# Patient Record
Sex: FEMALE | Race: WHITE | NOT HISPANIC OR LATINO | Employment: UNEMPLOYED | URBAN - METROPOLITAN AREA
[De-identification: names, ages, dates, MRNs, and addresses within clinical notes are randomized per-mention and may not be internally consistent; named-entity substitution may affect disease eponyms.]

---

## 2022-08-11 ENCOUNTER — HOSPITAL ENCOUNTER (INPATIENT)
Facility: OTHER | Age: 22
LOS: 3 days | Discharge: HOME | DRG: 917 | End: 2022-08-14
Attending: EMERGENCY MEDICINE | Admitting: HOSPITALIST

## 2022-08-11 DIAGNOSIS — F32.A DEPRESSION, UNSPECIFIED DEPRESSION TYPE: Chronic | ICD-10-CM

## 2022-08-11 DIAGNOSIS — T43.501A: Primary | ICD-10-CM

## 2022-08-11 DIAGNOSIS — G93.41 ACUTE METABOLIC ENCEPHALOPATHY: ICD-10-CM

## 2022-08-11 DIAGNOSIS — R41.82 ALTERED MENTAL STATUS: ICD-10-CM

## 2022-08-11 DIAGNOSIS — R56.9 SEIZURE: ICD-10-CM

## 2022-08-11 DIAGNOSIS — F10.930 ALCOHOL WITHDRAWAL SYNDROME WITHOUT COMPLICATION: ICD-10-CM

## 2022-08-11 PROBLEM — T50.901A ACCIDENTAL DRUG OVERDOSE: Status: ACTIVE | Noted: 2022-08-11

## 2022-08-11 PROBLEM — T50.901A ACCIDENTAL DRUG OVERDOSE: Status: RESOLVED | Noted: 2022-08-11 | Resolved: 2022-08-11

## 2022-08-11 LAB
ALBUMIN SERPL BCP-MCNC: 3.8 G/DL (ref 3.5–5.2)
ALP SERPL-CCNC: 84 U/L (ref 55–135)
ALT SERPL W/O P-5'-P-CCNC: 50 U/L (ref 10–44)
AMPHET+METHAMPHET UR QL: NEGATIVE
ANION GAP SERPL CALC-SCNC: 23 MMOL/L (ref 8–16)
APAP SERPL-MCNC: <3 UG/ML (ref 10–20)
AST SERPL-CCNC: 53 U/L (ref 10–40)
B-HCG UR QL: NEGATIVE
BARBITURATES UR QL SCN>200 NG/ML: NEGATIVE
BASOPHILS # BLD AUTO: 0.02 K/UL (ref 0–0.2)
BASOPHILS NFR BLD: 0.5 % (ref 0–1.9)
BENZODIAZ UR QL SCN>200 NG/ML: NEGATIVE
BILIRUB SERPL-MCNC: 0.3 MG/DL (ref 0.1–1)
BILIRUB UR QL STRIP: NEGATIVE
BUN SERPL-MCNC: 6 MG/DL (ref 6–20)
BZE UR QL SCN: NEGATIVE
CALCIUM SERPL-MCNC: 9.2 MG/DL (ref 8.7–10.5)
CANNABINOIDS UR QL SCN: NEGATIVE
CHLORIDE SERPL-SCNC: 106 MMOL/L (ref 95–110)
CLARITY UR: CLEAR
CO2 SERPL-SCNC: 15 MMOL/L (ref 23–29)
COLOR UR: YELLOW
CREAT SERPL-MCNC: 0.8 MG/DL (ref 0.5–1.4)
CREAT UR-MCNC: 156.1 MG/DL (ref 15–325)
CTP QC/QA: YES
CTP QC/QA: YES
DIFFERENTIAL METHOD: ABNORMAL
EOSINOPHIL # BLD AUTO: 0 K/UL (ref 0–0.5)
EOSINOPHIL NFR BLD: 0 % (ref 0–8)
ERYTHROCYTE [DISTWIDTH] IN BLOOD BY AUTOMATED COUNT: 13.2 % (ref 11.5–14.5)
EST. GFR  (NO RACE VARIABLE): >60 ML/MIN/1.73 M^2
ETHANOL SERPL-MCNC: <10 MG/DL
GLUCOSE SERPL-MCNC: 141 MG/DL (ref 70–110)
GLUCOSE UR QL STRIP: NEGATIVE
HCT VFR BLD AUTO: 37.1 % (ref 37–48.5)
HCV AB SERPL QL IA: NEGATIVE
HGB BLD-MCNC: 12.5 G/DL (ref 12–16)
HGB UR QL STRIP: NEGATIVE
HIV 1+2 AB+HIV1 P24 AG SERPL QL IA: NEGATIVE
IMM GRANULOCYTES # BLD AUTO: 0.03 K/UL (ref 0–0.04)
IMM GRANULOCYTES NFR BLD AUTO: 0.8 % (ref 0–0.5)
KETONES UR QL STRIP: NEGATIVE
LACTATE SERPL-SCNC: 2.1 MMOL/L (ref 0.5–2.2)
LEUKOCYTE ESTERASE UR QL STRIP: NEGATIVE
LYMPHOCYTES # BLD AUTO: 1.1 K/UL (ref 1–4.8)
LYMPHOCYTES NFR BLD: 30.1 % (ref 18–48)
MAGNESIUM SERPL-MCNC: 1.2 MG/DL (ref 1.6–2.6)
MCH RBC QN AUTO: 35.7 PG (ref 27–31)
MCHC RBC AUTO-ENTMCNC: 33.7 G/DL (ref 32–36)
MCV RBC AUTO: 106 FL (ref 82–98)
METHADONE UR QL SCN>300 NG/ML: NEGATIVE
MONOCYTES # BLD AUTO: 0.4 K/UL (ref 0.3–1)
MONOCYTES NFR BLD: 11.6 % (ref 4–15)
NEUTROPHILS # BLD AUTO: 2.1 K/UL (ref 1.8–7.7)
NEUTROPHILS NFR BLD: 57 % (ref 38–73)
NITRITE UR QL STRIP: NEGATIVE
NRBC BLD-RTO: 0 /100 WBC
OPIATES UR QL SCN: NEGATIVE
PCP UR QL SCN>25 NG/ML: NEGATIVE
PH UR STRIP: 6 [PH] (ref 5–8)
PHOSPHATE SERPL-MCNC: 4 MG/DL (ref 2.7–4.5)
PLATELET # BLD AUTO: 96 K/UL (ref 150–450)
PMV BLD AUTO: 8.7 FL (ref 9.2–12.9)
POTASSIUM SERPL-SCNC: 3.6 MMOL/L (ref 3.5–5.1)
PROT SERPL-MCNC: 6.8 G/DL (ref 6–8.4)
PROT UR QL STRIP: NEGATIVE
RBC # BLD AUTO: 3.5 M/UL (ref 4–5.4)
SALICYLATES SERPL-MCNC: <5 MG/DL (ref 15–30)
SARS-COV-2 RDRP RESP QL NAA+PROBE: NEGATIVE
SODIUM SERPL-SCNC: 144 MMOL/L (ref 136–145)
SP GR UR STRIP: >=1.03 (ref 1–1.03)
TOXICOLOGY INFORMATION: NORMAL
URN SPEC COLLECT METH UR: ABNORMAL
UROBILINOGEN UR STRIP-ACNC: NEGATIVE EU/DL
WBC # BLD AUTO: 3.72 K/UL (ref 3.9–12.7)

## 2022-08-11 PROCEDURE — 84100 ASSAY OF PHOSPHORUS: CPT | Performed by: EMERGENCY MEDICINE

## 2022-08-11 PROCEDURE — 96361 HYDRATE IV INFUSION ADD-ON: CPT

## 2022-08-11 PROCEDURE — 96365 THER/PROPH/DIAG IV INF INIT: CPT

## 2022-08-11 PROCEDURE — 80053 COMPREHEN METABOLIC PANEL: CPT | Performed by: EMERGENCY MEDICINE

## 2022-08-11 PROCEDURE — 99291 CRITICAL CARE FIRST HOUR: CPT | Mod: 25

## 2022-08-11 PROCEDURE — 99223 1ST HOSP IP/OBS HIGH 75: CPT | Mod: ,,, | Performed by: HOSPITALIST

## 2022-08-11 PROCEDURE — 63600175 PHARM REV CODE 636 W HCPCS: Performed by: EMERGENCY MEDICINE

## 2022-08-11 PROCEDURE — 81003 URINALYSIS AUTO W/O SCOPE: CPT | Mod: 59 | Performed by: HOSPITALIST

## 2022-08-11 PROCEDURE — 80143 DRUG ASSAY ACETAMINOPHEN: CPT | Performed by: EMERGENCY MEDICINE

## 2022-08-11 PROCEDURE — U0002 COVID-19 LAB TEST NON-CDC: HCPCS | Performed by: EMERGENCY MEDICINE

## 2022-08-11 PROCEDURE — 85025 COMPLETE CBC W/AUTO DIFF WBC: CPT | Performed by: EMERGENCY MEDICINE

## 2022-08-11 PROCEDURE — 87389 HIV-1 AG W/HIV-1&-2 AB AG IA: CPT | Performed by: EMERGENCY MEDICINE

## 2022-08-11 PROCEDURE — 25000003 PHARM REV CODE 250: Performed by: HOSPITALIST

## 2022-08-11 PROCEDURE — 25000003 PHARM REV CODE 250: Performed by: EMERGENCY MEDICINE

## 2022-08-11 PROCEDURE — 93005 ELECTROCARDIOGRAM TRACING: CPT

## 2022-08-11 PROCEDURE — 20000000 HC ICU ROOM

## 2022-08-11 PROCEDURE — 83605 ASSAY OF LACTIC ACID: CPT | Performed by: EMERGENCY MEDICINE

## 2022-08-11 PROCEDURE — 93010 EKG 12-LEAD: ICD-10-PCS | Mod: ,,, | Performed by: INTERNAL MEDICINE

## 2022-08-11 PROCEDURE — 82077 ASSAY SPEC XCP UR&BREATH IA: CPT | Performed by: EMERGENCY MEDICINE

## 2022-08-11 PROCEDURE — 96374 THER/PROPH/DIAG INJ IV PUSH: CPT | Mod: 59

## 2022-08-11 PROCEDURE — 86803 HEPATITIS C AB TEST: CPT | Performed by: EMERGENCY MEDICINE

## 2022-08-11 PROCEDURE — 99223 PR INITIAL HOSPITAL CARE,LEVL III: ICD-10-PCS | Mod: ,,, | Performed by: HOSPITALIST

## 2022-08-11 PROCEDURE — 96376 TX/PRO/DX INJ SAME DRUG ADON: CPT

## 2022-08-11 PROCEDURE — 63600175 PHARM REV CODE 636 W HCPCS: Performed by: HOSPITALIST

## 2022-08-11 PROCEDURE — 63600175 PHARM REV CODE 636 W HCPCS

## 2022-08-11 PROCEDURE — 83735 ASSAY OF MAGNESIUM: CPT | Performed by: EMERGENCY MEDICINE

## 2022-08-11 PROCEDURE — 80307 DRUG TEST PRSMV CHEM ANLYZR: CPT | Performed by: EMERGENCY MEDICINE

## 2022-08-11 PROCEDURE — 93010 ELECTROCARDIOGRAM REPORT: CPT | Mod: ,,, | Performed by: INTERNAL MEDICINE

## 2022-08-11 PROCEDURE — 96375 TX/PRO/DX INJ NEW DRUG ADDON: CPT

## 2022-08-11 PROCEDURE — 81025 URINE PREGNANCY TEST: CPT | Performed by: EMERGENCY MEDICINE

## 2022-08-11 PROCEDURE — 80179 DRUG ASSAY SALICYLATE: CPT | Performed by: EMERGENCY MEDICINE

## 2022-08-11 RX ORDER — TALC
6 POWDER (GRAM) TOPICAL NIGHTLY PRN
Status: DISCONTINUED | OUTPATIENT
Start: 2022-08-11 | End: 2022-08-14 | Stop reason: HOSPADM

## 2022-08-11 RX ORDER — LORAZEPAM 2 MG/ML
2 INJECTION INTRAMUSCULAR
Status: DISCONTINUED | OUTPATIENT
Start: 2022-08-11 | End: 2022-08-12

## 2022-08-11 RX ORDER — LORAZEPAM 2 MG/ML
INJECTION INTRAMUSCULAR
Status: COMPLETED
Start: 2022-08-11 | End: 2022-08-11

## 2022-08-11 RX ORDER — LORAZEPAM 2 MG/ML
2 INJECTION INTRAMUSCULAR
Status: DISCONTINUED | OUTPATIENT
Start: 2022-08-11 | End: 2022-08-11

## 2022-08-11 RX ORDER — SODIUM CHLORIDE 9 MG/ML
1000 INJECTION, SOLUTION INTRAVENOUS
Status: COMPLETED | OUTPATIENT
Start: 2022-08-11 | End: 2022-08-11

## 2022-08-11 RX ORDER — QUETIAPINE FUMARATE 100 MG/1
150 TABLET, FILM COATED ORAL DAILY
Status: ON HOLD | COMMUNITY
End: 2022-08-11 | Stop reason: CLARIF

## 2022-08-11 RX ORDER — SERTRALINE HYDROCHLORIDE 100 MG/1
100 TABLET, FILM COATED ORAL DAILY
Status: ON HOLD | COMMUNITY
End: 2022-08-14 | Stop reason: HOSPADM

## 2022-08-11 RX ORDER — LORAZEPAM 2 MG/ML
2 INJECTION INTRAMUSCULAR
Status: COMPLETED | OUTPATIENT
Start: 2022-08-11 | End: 2022-08-11

## 2022-08-11 RX ORDER — ONDANSETRON 2 MG/ML
4 INJECTION INTRAMUSCULAR; INTRAVENOUS EVERY 8 HOURS PRN
Status: DISCONTINUED | OUTPATIENT
Start: 2022-08-11 | End: 2022-08-14 | Stop reason: HOSPADM

## 2022-08-11 RX ORDER — SERTRALINE HCL 50 MG
100 TABLET ORAL DAILY
Status: ON HOLD | COMMUNITY
Start: 2022-06-29 | End: 2022-08-11 | Stop reason: SDUPTHER

## 2022-08-11 RX ORDER — DIPHENHYDRAMINE HYDROCHLORIDE 50 MG/ML
12.5 INJECTION INTRAMUSCULAR; INTRAVENOUS
Status: COMPLETED | OUTPATIENT
Start: 2022-08-11 | End: 2022-08-11

## 2022-08-11 RX ORDER — SODIUM CHLORIDE, SODIUM LACTATE, POTASSIUM CHLORIDE, CALCIUM CHLORIDE 600; 310; 30; 20 MG/100ML; MG/100ML; MG/100ML; MG/100ML
INJECTION, SOLUTION INTRAVENOUS CONTINUOUS
Status: DISCONTINUED | OUTPATIENT
Start: 2022-08-11 | End: 2022-08-14 | Stop reason: HOSPADM

## 2022-08-11 RX ORDER — SODIUM CHLORIDE 0.9 % (FLUSH) 0.9 %
10 SYRINGE (ML) INJECTION EVERY 12 HOURS PRN
Status: DISCONTINUED | OUTPATIENT
Start: 2022-08-11 | End: 2022-08-14 | Stop reason: HOSPADM

## 2022-08-11 RX ORDER — QUETIAPINE FUMARATE 50 MG/1
50 TABLET, FILM COATED ORAL NIGHTLY
Status: ON HOLD | COMMUNITY
Start: 2022-07-24 | End: 2022-08-14 | Stop reason: HOSPADM

## 2022-08-11 RX ADMIN — LORAZEPAM 2 MG: 2 INJECTION INTRAMUSCULAR; INTRAVENOUS at 06:08

## 2022-08-11 RX ADMIN — THIAMINE HYDROCHLORIDE 500 MG: 100 INJECTION, SOLUTION INTRAMUSCULAR; INTRAVENOUS at 04:08

## 2022-08-11 RX ADMIN — DIPHENHYDRAMINE HYDROCHLORIDE 12.5 MG: 50 INJECTION, SOLUTION INTRAMUSCULAR; INTRAVENOUS at 12:08

## 2022-08-11 RX ADMIN — LORAZEPAM 2 MG: 2 INJECTION INTRAMUSCULAR; INTRAVENOUS at 09:08

## 2022-08-11 RX ADMIN — SODIUM CHLORIDE, SODIUM LACTATE, POTASSIUM CHLORIDE, AND CALCIUM CHLORIDE: .6; .31; .03; .02 INJECTION, SOLUTION INTRAVENOUS at 04:08

## 2022-08-11 RX ADMIN — THIAMINE HYDROCHLORIDE 500 MG: 100 INJECTION, SOLUTION INTRAMUSCULAR; INTRAVENOUS at 09:08

## 2022-08-11 RX ADMIN — LORAZEPAM 2 MG: 2 INJECTION INTRAMUSCULAR at 09:08

## 2022-08-11 RX ADMIN — SODIUM CHLORIDE 1000 ML: 0.9 INJECTION, SOLUTION INTRAVENOUS at 11:08

## 2022-08-11 RX ADMIN — LORAZEPAM 2 MG: 2 INJECTION INTRAMUSCULAR; INTRAVENOUS at 08:08

## 2022-08-11 RX ADMIN — SODIUM CHLORIDE 1000 ML: 0.9 INJECTION, SOLUTION INTRAVENOUS at 09:08

## 2022-08-11 NOTE — H&P
"[facility] Medicine  History & Physical    Patient Name: Juany Hairston  MRN: [id number]  Patient Class: OP- Observation  Admission Date: 8/11/2022  Attending Physician: No att. providers found   Primary Care Provider: Primary Doctor No         Patient information was obtained from boyfriend at bedside and mother over the phone and ER records.     Subjective:     Principal Problem:Overdose of antipsychotic, accidental or unintentional, initial encounter    Chief Complaint:   Chief Complaint   Patient presents with    Altered Mental Status     EMS activated by boyfriend who reported pt "very fidgety" with an episode of convulsions; EMS reports pt appeared post-ictal upon arrival, no hx of seizures; pt denies any recent drug use, admits to ETOH use last night; EMS reports HR of 148 that has decreased to 136 after fluids, hx of WPW        HPI: HPI is taken from pt's boyfriend Sravani Kathleen at bedside as patient is currently disoriented.     Juany Hairston is a 22 year old lady with a PMHx of bipolar and depression noncompliant to medication, WPW syndrome and alcohol dependence who was brought into the ED via EMS who was activated by her boyfriend for a witnessed tonic clonic seizure. Pt's boyfriend reports that he found pt awake this morning at 7.30 am PTA being very fidgety, paranoid with hyperarousal, which was different from her baseline behavior. Pt's boyfriend then witnessed a tonic clonic seizure lasting 20-25s on the bed with no other sustained injuries. He reports not knowing if patient bit her tongue. Patient's boyfriend noticed that her pill box containing at least 15 pills of 50mg seroquel and at least 15 pills of 100mg seroquel were empty. Pt's boyfriend had prepared her pill box on Sunday prior to her travel to [place]. He reports that pt's other medications such as vitamin C and K pills (for bruising) were intact. Pt's boyfriend reports that she does not take her prescribed " medications daily.     Patient's boyfriend reports that she was visiting from out of state and she is residing in [place]. Pt was last well at 10pm last night after returning home from dinner and drinks.     Patient had a seizure in the ED and was given Lorazepam 2mg IV. CT brain was negative. Workup shows increased anion gap with otherwise no metabolic abnormalities. UDS -ve, serum tylenol and etoh negative. EKG was positive for WPW. She is admitted to hospital medicine for seizure risk and altered mental state.       Past Medical History:   Diagnosis Date    Bipolar affective disorder, currently active 2019    As per pt's mother Maria Isabel [phone]    Depression 2019    As per pt's mother's account    Ibuprofen overdose 2018    As per pt's mother's account    WPW (Kory-Parkinson-White syndrome) 2018    As per pt's mother's account       No past surgical history on file.    Review of patient's allergies indicates:  No Known Allergies    No current facility-administered medications on file prior to encounter.     Current Outpatient Medications on File Prior to Encounter   Medication Sig    QUEtiapine (SEROQUEL) 100 MG Tab Take 150 mg by mouth once daily.    sertraline (ZOLOFT) 100 MG tablet Take 100 mg by mouth once daily.     Family History    None       Tobacco Use    Smoking status: Current Every Day Smoker     Types: Vaping with nicotine    Smokeless tobacco: Never Used    Tobacco comment: pt is not oriented at the moment   Substance and Sexual Activity    Alcohol use: Yes     Alcohol/week: 8.0 standard drinks     Types: 8 Glasses of wine per week    Drug use: Not Currently    Sexual activity: Not on file     Review of Systems   Reason unable to perform ROS: Patient is AAOx1 only oriented to name, incoherent speech, unable to follow commands.   Objective:     Vital Signs (Most Recent):  Temp: 97.5 °F (36.4 °C) (08/11/22 0932)  Pulse: (!) 118 (08/11/22 1432)  Resp: 17 (08/11/22 1432)  BP: (!)  128/94 (08/11/22 1432)  SpO2: 99 % (08/11/22 1432)   Vital Signs (24h Range):  Temp:  [97.5 °F (36.4 °C)] 97.5 °F (36.4 °C)  Pulse:  [117-143] 118  Resp:  [17-19] 17  SpO2:  [98 %-100 %] 99 %  BP: (123-149)/(63-99) 128/94     Weight: 61.2 kg (135 lb)  Body mass index is 21.14 kg/m².    Physical Exam  Vitals and nursing note reviewed.   Constitutional:       Appearance: She is not ill-appearing, toxic-appearing or diaphoretic.      Comments: Fidgety   HENT:      Head: Normocephalic and atraumatic.      Nose: No congestion or rhinorrhea.      Mouth/Throat:      Mouth: Mucous membranes are dry.      Pharynx: Oropharynx is clear.   Eyes:      General: No scleral icterus.        Right eye: No discharge.         Left eye: No discharge.      Pupils: Pupils are equal, round, and reactive to light.   Cardiovascular:      Rate and Rhythm: Regular rhythm. Tachycardia present.      Pulses: Normal pulses.      Heart sounds: Normal heart sounds.   Pulmonary:      Effort: Pulmonary effort is normal.      Breath sounds: Normal breath sounds.   Abdominal:      General: Bowel sounds are normal.      Palpations: Abdomen is soft.   Musculoskeletal:      Cervical back: Normal range of motion and neck supple.      Right lower leg: No edema.      Left lower leg: No edema.   Skin:     General: Skin is warm and dry.      Findings: Bruising (chronic, easily bruised, on multivitamins C and K) present.   Neurological:      Mental Status: She is alert. She is disoriented and confused.      GCS: GCS eye subscore is 4. GCS verbal subscore is 2. GCS motor subscore is 5.   Psychiatric:      Comments: Hyperaroused, fidgety, incoherent speech         CRANIAL NERVES     CN III, IV, VI   Pupils are equal, round, and reactive to light.     Significant Labs: All pertinent labs within the past 24 hours have been reviewed.  BMP:   Recent Labs   Lab 08/11/22  0957   *      K 3.6      CO2 15*   BUN 6   CREATININE 0.8   CALCIUM 9.2     CBC:    Recent Labs   Lab 08/11/22  0957   WBC 3.72*   HGB 12.5   HCT 37.1   PLT 96*     Lactic Acid:   Recent Labs   Lab 08/11/22  1128   LACTATE 2.1     Urine Studies: No results for input(s): COLORU, APPEARANCEUA, PHUR, SPECGRAV, PROTEINUA, GLUCUA, KETONESU, BILIRUBINUA, OCCULTUA, NITRITE, UROBILINOGEN, LEUKOCYTESUR, RBCUA, WBCUA, BACTERIA, SQUAMEPITHEL, HYALINECASTS in the last 48 hours.    Invalid input(s): DARON    Significant Imaging: I have reviewed and interpreted all pertinent imaging results/findings within the past 24 hours.    Assessment/Plan:     * Overdose of antipsychotic, accidental or unintentional, initial encounter  Hx of bipolar and depression noncompliant to medication, hx of ibuprofen overdose at age 18   On home dose quetiapine 150mg PO nightly and sertraline 100mg qd  Unwitnessed overdose of maximum 15x amount of prescribed quetiapine last night PTA, x2 witnessed seizures  UDS -ve, serum etoh and tylenol level -ve, metabolic acidosis  AAOx1 (oriented to name), tachycardic into the 130s otherwise HDS, sats 99% on room air  Lorazepam 2mg IV given in ED, 2L bolus NS given in ED     - Admit to obs with continuous tele monitoring   - Fall risk, seizure precautions, aspiration risk  - Neuro checks Q4 with reorientation   - NPO until AMS resolves   - Maintenance IVF 100ml/hr LR  - Purewick, chart output   - IV Lorazepam 2mg PRN for seizures         Depression  Patient has persistent depression which is unknown and is currently controlled. Will Continue anti-depressant medications. We will not consult psychiatry at this time. Patient does not display psychosis at this time. Continue to monitor closely and adjust plan of care as needed.   Patient sees a telemedicine psychiatrist in [place].     - Resume home dose quetiapine 150mg nightly and sertraline 100mg nightly when aspiration risk is cleared         Seizure  No known hx of seizure, x2 seizures today, 1 PTA and 1 in ED   Likely due to  overdose of quetiapine     - Management as above         VTE Risk Mitigation (From admission, onward)         Ordered     IP VTE LOW RISK PATIENT  Once         08/11/22 1357     Place sequential compression device  Until discontinued         08/11/22 1357                   Vanda Hoang NP  Department of Hospital Medicine   [facility] - Emergency Dept

## 2022-08-11 NOTE — ASSESSMENT & PLAN NOTE
Pharmacy refill records does not coincide with patients reported dosage of quetiapine.  Consult Psychiatry for evaluation further treatment recommendations.
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
## 2022-08-11 NOTE — ASSESSMENT & PLAN NOTE
Reported heavy ongoing alcohol use.  Evidence of chronic liver disease with macrocytosis and thrombocytopenia.  Patient counseled importance of staying further alcohol abuse.  Continue intravenous thiamine and as needed benzodiazepines.

## 2022-08-11 NOTE — ED NOTES
Pt resting quietly on stretcher with eyes closed, responds to noxious stimuli. Pt remains on continuous cardiac and pulse ox monitoring with non-invasive blood pressure to cycle every 30 minutes. Bed locked in lowest position; side rails up and locked x 2; call light, bedside table, and personal belongings within reach. Room assessed for safety measures and cleanliness; no action needed at this time. Will continue to monitor. Boyfriend at bedside.

## 2022-08-11 NOTE — ED NOTES
While transferring pt to [facility], pt began having seizure. MD aware, at bedside, will order ativan.

## 2022-08-11 NOTE — ED TRIAGE NOTES
"Pt presents to the ED via EMS after boyfriend called 911 due to pt being altered. Per pt's boyfriend, pt was very fidgety and convulsing. Denies hx of seizures. Pt's boyfriend denies pt drug use; reports ETOH use last night. Pt's boyfriend states "she is bad about taking her medications so I packed them for the trip but I noticed this morning all of her Seroquel is missing so I am unsure if she took all of them at one time." Hx of bipolar disorder and WPW. Ems reports tachycardia in 140's prior to arrival.   "

## 2022-08-11 NOTE — ASSESSMENT & PLAN NOTE
Hx of bipolar and depression noncompliant to medication, hx of ibuprofen overdose at age 18   On home dose quetiapine 150mg PO nightly and sertraline 100mg qd  Unwitnessed overdose of maximum 15x amount of prescribed quetiapine last night PTA, x2 witnessed seizures  UDS -ve, serum etoh and tylenol level -ve, metabolic acidosis  AAOx1 (oriented to name), tachycardic into the 130s otherwise HDS, sats 99% on room air  Lorazepam 2mg IV given in ED, 2L bolus NS given in ED     - Admit to obs with continuous tele monitoring   - Fall risk, seizure precautions, aspiration risk  - Neuro checks Q4 with reorientation   - NPO until AMS resolves   - Maintenance IVF 100ml/hr LR  - Purewick, chart output   - IV Lorazepam 2mg PRN for seizures

## 2022-08-11 NOTE — ASSESSMENT & PLAN NOTE
Slowly improving.  No further seizures.  Suspect presentation secondary to combination of quetiapine overdose (unclear of intentional) and alcohol intoxication/withdrawal with metabolic encephalopathy and Wernicke's encephalopathy.  Clinically improving with supportive care including as-needed benzodiazepines and intravenous thiamine.  Will continue current measures.  Patient states that she believes she took extra quetiapine yesterday but when asked why she does not answer.  Consult Psychiatry to evaluate for possible risk of further self-harm.

## 2022-08-11 NOTE — ASSESSMENT & PLAN NOTE
Patient has persistent depression which is unknown and is currently controlled. Will Continue anti-depressant medications. We will not consult psychiatry at this time. Patient does not display psychosis at this time. Continue to monitor closely and adjust plan of care as needed.   Patient sees a telemedicine psychiatrist in [place].     - Resume home dose quetiapine 150mg nightly and sertraline 100mg nightly when aspiration risk is cleared        [email or web address]:[id number]]

## 2022-08-11 NOTE — HPI
"Per Vanda Hoang, NP:    "HPI is taken from pt's boyfriend Sravani Kathleen at bedside as patient is currently disoriented.     Juany Hairston is a 22 year old lady with a PMHx of bipolar and depression noncompliant to medication, WPW syndrome and heavy alcohol use who was brought into the ED via EMS who was activated by her boyfriend for a witnessed tonic clonic seizure. Pt's boyfriend reports that he found pt awake this morning at 7.30 am PTA being very fidgety, paranoid with hyperarousal, which was different from her baseline behavior. Pt's boyfriend then witnessed a tonic clonic seizure lasting 20-25s on the bed with no other sustained injuries. He reports not knowing if patient bit her tongue. Patient's boyfriend noticed that her pill box containing at least 15 pills of 50mg seroquel and at least 15 pills of 100mg seroquel were empty. Pt's boyfriend had prepared her pill box on Sunday prior to her travel to [place]. He reports that pt's other medications such as vitamin C and K pills (for bruising) were intact. Pt's boyfriend reports that she does not take her prescribed medications daily.     Patient's boyfriend reports that she was visiting from out of state and she is residing in [place]. Pt was last well at 10pm last night after returning home from dinner and drinks.     Patient had a seizure in the ED and was given Lorazepam 2mg IV. CT brain was negative. Workup shows increased anion gap with otherwise no metabolic abnormalities. UDS -ve, serum tylenol and etoh negative. EKG was positive for WPW. She is admitted to hospital medicine for seizure risk and altered mental state. "  "

## 2022-08-11 NOTE — ASSESSMENT & PLAN NOTE
No known hx of seizure, x2 seizures today, 1 PTA and 1 in ED   Likely due to overdose of quetiapine     - Management as above

## 2022-08-11 NOTE — SUBJECTIVE & OBJECTIVE
Past Medical History:   Diagnosis Date    Bipolar affective disorder, currently active 2019    As per pt's mother Maria Isabel [phone]    Depression 2019    As per pt's mother's account    Ibuprofen overdose 2018    As per pt's mother's account    WPW (Kory-Parkinson-White syndrome) 2018    As per pt's mother's account       No past surgical history on file.    Review of patient's allergies indicates:  No Known Allergies    No current facility-administered medications on file prior to encounter.     Current Outpatient Medications on File Prior to Encounter   Medication Sig    QUEtiapine (SEROQUEL) 100 MG Tab Take 150 mg by mouth once daily.    sertraline (ZOLOFT) 100 MG tablet Take 100 mg by mouth once daily.     Family History    None       Tobacco Use    Smoking status: Current Every Day Smoker     Types: Vaping with nicotine    Smokeless tobacco: Never Used    Tobacco comment: pt is not oriented at the moment   Substance and Sexual Activity    Alcohol use: Yes     Alcohol/week: 8.0 standard drinks     Types: 8 Glasses of wine per week    Drug use: Not Currently    Sexual activity: Not on file     Review of Systems   Reason unable to perform ROS: Patient is AAOx1 only oriented to name, incoherent speech, unable to follow commands.   Objective:     Vital Signs (Most Recent):  Temp: 97.5 °F (36.4 °C) (08/11/22 0932)  Pulse: (!) 118 (08/11/22 1432)  Resp: 17 (08/11/22 1432)  BP: (!) 128/94 (08/11/22 1432)  SpO2: 99 % (08/11/22 1432)   Vital Signs (24h Range):  Temp:  [97.5 °F (36.4 °C)] 97.5 °F (36.4 °C)  Pulse:  [117-143] 118  Resp:  [17-19] 17  SpO2:  [98 %-100 %] 99 %  BP: (123-149)/(63-99) 128/94     Weight: 61.2 kg (135 lb)  Body mass index is 21.14 kg/m².    Physical Exam  Vitals and nursing note reviewed.   Constitutional:       Appearance: She is not ill-appearing, toxic-appearing or diaphoretic.      Comments: Fidgety   HENT:      Head: Normocephalic and atraumatic.      Nose: No congestion or rhinorrhea.       Mouth/Throat:      Mouth: Mucous membranes are dry.      Pharynx: Oropharynx is clear.   Eyes:      General: No scleral icterus.        Right eye: No discharge.         Left eye: No discharge.      Pupils: Pupils are equal, round, and reactive to light.   Cardiovascular:      Rate and Rhythm: Regular rhythm. Tachycardia present.      Pulses: Normal pulses.      Heart sounds: Normal heart sounds.   Pulmonary:      Effort: Pulmonary effort is normal.      Breath sounds: Normal breath sounds.   Abdominal:      General: Bowel sounds are normal.      Palpations: Abdomen is soft.   Musculoskeletal:      Cervical back: Normal range of motion and neck supple.      Right lower leg: No edema.      Left lower leg: No edema.   Skin:     General: Skin is warm and dry.      Findings: Bruising (chronic, easily bruised, on multivitamins C and K) present.   Neurological:      Mental Status: She is alert. She is disoriented and confused.      GCS: GCS eye subscore is 4. GCS verbal subscore is 2. GCS motor subscore is 5.   Psychiatric:      Comments: Hyperaroused, fidgety, incoherent speech         CRANIAL NERVES     CN III, IV, VI   Pupils are equal, round, and reactive to light.     Significant Labs: All pertinent labs within the past 24 hours have been reviewed.  BMP:   Recent Labs   Lab 08/11/22  0957   *      K 3.6      CO2 15*   BUN 6   CREATININE 0.8   CALCIUM 9.2     CBC:   Recent Labs   Lab 08/11/22  0957   WBC 3.72*   HGB 12.5   HCT 37.1   PLT 96*     Lactic Acid:   Recent Labs   Lab 08/11/22  1128   LACTATE 2.1     Urine Studies: No results for input(s): COLORU, APPEARANCEUA, PHUR, SPECGRAV, PROTEINUA, GLUCUA, KETONESU, BILIRUBINUA, OCCULTUA, NITRITE, UROBILINOGEN, LEUKOCYTESUR, RBCUA, WBCUA, BACTERIA, SQUAMEPITHEL, HYALINECASTS in the last 48 hours.    Invalid input(s): DARON    Significant Imaging: I have reviewed and interpreted all pertinent imaging results/findings within the past 24  hours.

## 2022-08-11 NOTE — ED NOTES
Pt awake, but unable to answer questions. Pt remains on continuous cardiac and pulse ox monitoring with non-invasive blood pressure to cycle every 30 minutes. Bed locked in lowest position; side rails up and locked x 2; call light, bedside table, and personal belongings within reach. Room assessed for safety measures and cleanliness; no action needed at this time. Will continue to monitor. Boyfriend at bedside.

## 2022-08-11 NOTE — ED PROVIDER NOTES
"SCRIBE #1 NOTE: I, Lashaun Horn, am scribing for, and in the presence of, Mp Norris DO.         Source of History:  EMS    Chief complaint:  Altered Mental Status (EMS activated by boyfriend who reported pt "very fidgety" with an episode of convulsions; EMS reports pt appeared post-ictal upon arrival, no hx of seizures; pt denies any recent drug use, admits to ETOH use last night; EMS reports HR of 148 that has decreased to 136 after fluids, hx of WPW)      HPI:  Juany Hairston is a 22 y.o. female presenting with altered mental status. Per EMS the pt's boyfriend reports that he woke up to pt being "fidgety." Following this, the boyfriend reported to EMS that he witnessed the pt have a seizure. Per EMS pt is visiting from out of town. Per EMS pt was drinking last night, but no drug use. Pt has no prior history of seizures.     History is limited by the condition of patient.     This is the extent to the patients complaints today here in the emergency department.    ROS: Unable to be obtained due to condition of patient.    Review of patient's allergies indicates:  No Known Allergies    PMH:  As per HPI and below:  No past medical history on file.  No past surgical history on file.         Physical Exam:    /81   Pulse (!) 124   Temp 97.5 °F (36.4 °C) (Axillary)   Resp 17   Ht 5' 7" (1.702 m)   Wt 61.2 kg (135 lb)   SpO2 100%   BMI 21.14 kg/m²   Nursing note and vital signs reviewed.  Constitutional: No evidence of trauma.  Nontoxic  Eyes: Eyes open. Pupils 4 mm and reactive. Orbital nystagmus. No conjunctival injection.   ENT: Oropharynx clear.  Normal phonation.   Cardiovascular: Tachycardic rate. Regular rhythm. No murmurs. No gallops. No rubs  Respiratory: Clear to auscultation bilaterally.  Good air movement.  No wheezes.  No rhonchi. No rales. No accessory muscle use..  Abdomen: Benign. Soft.  Not distended.  Nontender.  No guarding.  No rebound. Non-peritoneal.  Musculoskeletal: Good " range of motion all joints.  No deformities.  Neck supple.  No meningismus.  Skin: No rashes seen.  Good turgor.  No abrasions.  No ecchymoses.  Neurologic: Initial seizure. Generalized tonic clonic seizure.   Psych: Appropriate, conversant    Critical Care    Date/Time: 8/11/2022 9:28 AM  Performed by: Mp Norris DO  Authorized by: Mp Norris DO   Direct patient critical care time: 45 minutes  Additional history critical care time: 10 minutes  Ordering / reviewing critical care time: 5 minutes  Documentation critical care time: 6 minutes  Total critical care time (exclusive of procedural time) : 66 minutes           Labs that have been ordered have been independently reviewed and interpreted by myself.    I decided to obtain the patient's medical records.  Summary of Medical Records:      MDM/ Differential Dx:    22 y.o. female with altered mental status unknown reason.  Differential could include intracranial hemorrhage, drugs, alcohol, new onset seizure, arrhythmia.  Shortly after arrival the patient had a seizure was generalized.  Ativan be given.    ED Course as of 08/11/22 1242   Thu Aug 11, 2022   1001 EKG 12-lead  EKG independently interpreted by myself shows sinus tachycardia rate of 141, normal intervals, delta wave noted consistent with WPW, no acute ST T wave abnormalities.  Overall impression sinus tachycardia otherwise no acute disease. [SM]   1021 X-Ray Chest AP Portable  Chest x-ray independently interpreted by myself shows normal cardiac size, no infiltrate or focal consolidation, no pneumothorax, no bony abnormalities.  Overall impression negative chest x-ray. [SM]   1040 WBC(!): 3.72 [SM]   1040 Hemoglobin: 12.5 [SM]   1040 The patient's boyfriend, who is at bedside, stated he prior to the trip from [place] had filled her weekly pill  with Seroquel.  She is supposed to take 150 mg daily.  He stated 8 was full when they came on Monday.  He also feels that she only  took her pills once.  After the ambulance had arrived he looked at the pillbox and all of the Seroquel are missing.  He did not see any scattered around the room her in her purse.  He suspects that there was approximately 15, 100 mg pills missing as well as 15, 50 mg pills of Seroquel missing. [SM]   1103 On re-evaluation no further seizure activity.  Patient is still somnolent.  Continue to observe. [SM]   1104 Alcohol, Serum: <10 [SM]   1104 Anion Gap(!): 23 [SM]   1144 Drug screen panel, emergency  negative [SM]   1154 Lactate, Eze: 2.1 [SM]   1154 Upon re-evaluation the patient is still somnolent.  Awakes to verbal commands.  Is able to tell me what city she lives in. [SM]   1224 Acetaminophen (Tylenol), Serum(!): <3.0 [SM]   1224 Salicylate Lvl(!): <5.0 [SM]   1239 Discussed with Dr. Robert.  Will admit to observation under his service. []      ED Course User Index  [SM] Mp Norris DO                Scribe Attestation:   Scribe #1: I performed the above scribed service and the documentation accurately describes the services I performed. I attest to the accuracy of the note.      Physician Attestation for Scribe: I, Dr Mp Norris, reviewed documentation as scribed in my presence, which is both accurate and complete.    Diagnostic Impression:    1. Altered mental status         ED Disposition Condition    Observation                 Mp Norris DO  08/11/22 1242

## 2022-08-12 LAB
ALBUMIN SERPL BCP-MCNC: 3.9 G/DL (ref 3.5–5.2)
ALP SERPL-CCNC: 100 U/L (ref 55–135)
ALT SERPL W/O P-5'-P-CCNC: 50 U/L (ref 10–44)
ANION GAP SERPL CALC-SCNC: 12 MMOL/L (ref 8–16)
APTT BLDCRRT: 25.8 SEC (ref 21–32)
AST SERPL-CCNC: 77 U/L (ref 10–40)
BASOPHILS # BLD AUTO: 0.02 K/UL (ref 0–0.2)
BASOPHILS NFR BLD: 0.5 % (ref 0–1.9)
BILIRUB SERPL-MCNC: 0.9 MG/DL (ref 0.1–1)
BUN SERPL-MCNC: 4 MG/DL (ref 6–20)
CALCIUM SERPL-MCNC: 8.6 MG/DL (ref 8.7–10.5)
CHLORIDE SERPL-SCNC: 101 MMOL/L (ref 95–110)
CO2 SERPL-SCNC: 25 MMOL/L (ref 23–29)
CREAT SERPL-MCNC: 0.8 MG/DL (ref 0.5–1.4)
DIFFERENTIAL METHOD: ABNORMAL
EOSINOPHIL # BLD AUTO: 0.1 K/UL (ref 0–0.5)
EOSINOPHIL NFR BLD: 1.1 % (ref 0–8)
ERYTHROCYTE [DISTWIDTH] IN BLOOD BY AUTOMATED COUNT: 12.9 % (ref 11.5–14.5)
EST. GFR  (NO RACE VARIABLE): >60 ML/MIN/1.73 M^2
FOLATE SERPL-MCNC: 4.8 NG/ML (ref 4–24)
GLUCOSE SERPL-MCNC: 81 MG/DL (ref 70–110)
HCT VFR BLD AUTO: 34.7 % (ref 37–48.5)
HGB BLD-MCNC: 12.1 G/DL (ref 12–16)
IMM GRANULOCYTES # BLD AUTO: 0.02 K/UL (ref 0–0.04)
IMM GRANULOCYTES NFR BLD AUTO: 0.5 % (ref 0–0.5)
INR PPP: 1 (ref 0.8–1.2)
LYMPHOCYTES # BLD AUTO: 1.2 K/UL (ref 1–4.8)
LYMPHOCYTES NFR BLD: 26.1 % (ref 18–48)
MAGNESIUM SERPL-MCNC: 1.2 MG/DL (ref 1.6–2.6)
MCH RBC QN AUTO: 35.4 PG (ref 27–31)
MCHC RBC AUTO-ENTMCNC: 34.9 G/DL (ref 32–36)
MCV RBC AUTO: 102 FL (ref 82–98)
MONOCYTES # BLD AUTO: 0.4 K/UL (ref 0.3–1)
MONOCYTES NFR BLD: 9.2 % (ref 4–15)
NEUTROPHILS # BLD AUTO: 2.8 K/UL (ref 1.8–7.7)
NEUTROPHILS NFR BLD: 62.6 % (ref 38–73)
NRBC BLD-RTO: 0 /100 WBC
PHOSPHATE SERPL-MCNC: 3.1 MG/DL (ref 2.7–4.5)
PLATELET # BLD AUTO: 92 K/UL (ref 150–450)
PMV BLD AUTO: 8.8 FL (ref 9.2–12.9)
POTASSIUM SERPL-SCNC: 3.2 MMOL/L (ref 3.5–5.1)
PROT SERPL-MCNC: 6.5 G/DL (ref 6–8.4)
PROTHROMBIN TIME: 10.8 SEC (ref 9–12.5)
RBC # BLD AUTO: 3.42 M/UL (ref 4–5.4)
SODIUM SERPL-SCNC: 138 MMOL/L (ref 136–145)
VIT B12 SERPL-MCNC: 763 PG/ML (ref 210–950)
WBC # BLD AUTO: 4.44 K/UL (ref 3.9–12.7)

## 2022-08-12 PROCEDURE — 99233 SBSQ HOSP IP/OBS HIGH 50: CPT | Mod: ,,, | Performed by: HOSPITALIST

## 2022-08-12 PROCEDURE — 85610 PROTHROMBIN TIME: CPT | Performed by: HOSPITALIST

## 2022-08-12 PROCEDURE — 97530 THERAPEUTIC ACTIVITIES: CPT

## 2022-08-12 PROCEDURE — 97116 GAIT TRAINING THERAPY: CPT

## 2022-08-12 PROCEDURE — 80053 COMPREHEN METABOLIC PANEL: CPT | Performed by: HOSPITALIST

## 2022-08-12 PROCEDURE — 84100 ASSAY OF PHOSPHORUS: CPT | Performed by: HOSPITALIST

## 2022-08-12 PROCEDURE — 97162 PT EVAL MOD COMPLEX 30 MIN: CPT

## 2022-08-12 PROCEDURE — 85730 THROMBOPLASTIN TIME PARTIAL: CPT | Performed by: HOSPITALIST

## 2022-08-12 PROCEDURE — 82607 VITAMIN B-12: CPT | Performed by: HOSPITALIST

## 2022-08-12 PROCEDURE — 85025 COMPLETE CBC W/AUTO DIFF WBC: CPT | Performed by: HOSPITALIST

## 2022-08-12 PROCEDURE — 25000003 PHARM REV CODE 250: Performed by: HOSPITALIST

## 2022-08-12 PROCEDURE — 20000000 HC ICU ROOM

## 2022-08-12 PROCEDURE — 97166 OT EVAL MOD COMPLEX 45 MIN: CPT

## 2022-08-12 PROCEDURE — 83735 ASSAY OF MAGNESIUM: CPT | Performed by: HOSPITALIST

## 2022-08-12 PROCEDURE — 82746 ASSAY OF FOLIC ACID SERUM: CPT | Performed by: HOSPITALIST

## 2022-08-12 PROCEDURE — G0425 PR INPT TELEHEALTH CONSULT 30M: ICD-10-PCS | Mod: GT,,, | Performed by: PSYCHIATRY & NEUROLOGY

## 2022-08-12 PROCEDURE — 36415 COLL VENOUS BLD VENIPUNCTURE: CPT | Performed by: HOSPITALIST

## 2022-08-12 PROCEDURE — 83921 ORGANIC ACID SINGLE QUANT: CPT | Performed by: HOSPITALIST

## 2022-08-12 PROCEDURE — 25000003 PHARM REV CODE 250: Performed by: NURSE PRACTITIONER

## 2022-08-12 PROCEDURE — 63600175 PHARM REV CODE 636 W HCPCS: Performed by: HOSPITALIST

## 2022-08-12 PROCEDURE — 99233 PR SUBSEQUENT HOSPITAL CARE,LEVL III: ICD-10-PCS | Mod: ,,, | Performed by: HOSPITALIST

## 2022-08-12 PROCEDURE — G0425 INPT/ED TELECONSULT30: HCPCS | Mod: GT,,, | Performed by: PSYCHIATRY & NEUROLOGY

## 2022-08-12 RX ORDER — LORAZEPAM 2 MG/ML
1 INJECTION INTRAMUSCULAR
Status: DISCONTINUED | OUTPATIENT
Start: 2022-08-12 | End: 2022-08-12

## 2022-08-12 RX ORDER — ACETAMINOPHEN 325 MG/1
650 TABLET ORAL ONCE
Status: COMPLETED | OUTPATIENT
Start: 2022-08-12 | End: 2022-08-12

## 2022-08-12 RX ORDER — LANOLIN ALCOHOL/MO/W.PET/CERES
400 CREAM (GRAM) TOPICAL ONCE
Status: COMPLETED | OUTPATIENT
Start: 2022-08-12 | End: 2022-08-12

## 2022-08-12 RX ORDER — POTASSIUM CHLORIDE 20 MEQ/1
40 TABLET, EXTENDED RELEASE ORAL ONCE
Status: COMPLETED | OUTPATIENT
Start: 2022-08-12 | End: 2022-08-12

## 2022-08-12 RX ORDER — DIVALPROEX SODIUM 250 MG/1
250 TABLET, DELAYED RELEASE ORAL EVERY 8 HOURS PRN
Status: DISCONTINUED | OUTPATIENT
Start: 2022-08-12 | End: 2022-08-14 | Stop reason: HOSPADM

## 2022-08-12 RX ORDER — DIAZEPAM 5 MG/1
5 TABLET ORAL EVERY 6 HOURS PRN
Status: DISCONTINUED | OUTPATIENT
Start: 2022-08-12 | End: 2022-08-12

## 2022-08-12 RX ORDER — MUPIROCIN 20 MG/G
OINTMENT TOPICAL 2 TIMES DAILY
Status: DISCONTINUED | OUTPATIENT
Start: 2022-08-12 | End: 2022-08-14 | Stop reason: HOSPADM

## 2022-08-12 RX ORDER — DEXMEDETOMIDINE HYDROCHLORIDE 4 UG/ML
0-1.4 INJECTION, SOLUTION INTRAVENOUS CONTINUOUS
Status: DISCONTINUED | OUTPATIENT
Start: 2022-08-12 | End: 2022-08-14 | Stop reason: HOSPADM

## 2022-08-12 RX ORDER — LORAZEPAM 1 MG/1
2 TABLET ORAL EVERY 4 HOURS PRN
Status: DISCONTINUED | OUTPATIENT
Start: 2022-08-12 | End: 2022-08-13

## 2022-08-12 RX ORDER — DIVALPROEX SODIUM 250 MG/1
500 TABLET, DELAYED RELEASE ORAL 2 TIMES DAILY
Status: DISCONTINUED | OUTPATIENT
Start: 2022-08-12 | End: 2022-08-14 | Stop reason: HOSPADM

## 2022-08-12 RX ADMIN — DEXMEDETOMIDINE HYDROCHLORIDE 1 MCG/KG/HR: 4 INJECTION, SOLUTION INTRAVENOUS at 10:08

## 2022-08-12 RX ADMIN — MUPIROCIN: 20 OINTMENT TOPICAL at 12:08

## 2022-08-12 RX ADMIN — DIVALPROEX SODIUM 500 MG: 250 TABLET, DELAYED RELEASE ORAL at 12:08

## 2022-08-12 RX ADMIN — Medication 400 MG: at 09:08

## 2022-08-12 RX ADMIN — LORAZEPAM 2 MG: 1 TABLET ORAL at 08:08

## 2022-08-12 RX ADMIN — THIAMINE HYDROCHLORIDE 500 MG: 100 INJECTION, SOLUTION INTRAMUSCULAR; INTRAVENOUS at 11:08

## 2022-08-12 RX ADMIN — MUPIROCIN: 20 OINTMENT TOPICAL at 08:08

## 2022-08-12 RX ADMIN — Medication 650 MG: at 10:08

## 2022-08-12 RX ADMIN — THIAMINE HYDROCHLORIDE 500 MG: 100 INJECTION, SOLUTION INTRAMUSCULAR; INTRAVENOUS at 04:08

## 2022-08-12 RX ADMIN — DIVALPROEX SODIUM 500 MG: 250 TABLET, DELAYED RELEASE ORAL at 08:08

## 2022-08-12 RX ADMIN — POTASSIUM CHLORIDE 40 MEQ: 1500 TABLET, EXTENDED RELEASE ORAL at 07:08

## 2022-08-12 RX ADMIN — DEXMEDETOMIDINE HYDROCHLORIDE 1 MCG/KG/HR: 4 INJECTION, SOLUTION INTRAVENOUS at 06:08

## 2022-08-12 RX ADMIN — LORAZEPAM 2 MG: 1 TABLET ORAL at 12:08

## 2022-08-12 RX ADMIN — DEXMEDETOMIDINE HYDROCHLORIDE 0.4 MCG/KG/HR: 4 INJECTION, SOLUTION INTRAVENOUS at 12:08

## 2022-08-12 RX ADMIN — THIAMINE HYDROCHLORIDE 500 MG: 100 INJECTION, SOLUTION INTRAMUSCULAR; INTRAVENOUS at 08:08

## 2022-08-12 NOTE — PT/OT/SLP EVAL
Physical Therapy Evaluation and Treatment    Patient Name:  Juany aHirston   MRN:  [id number]    Recommendations:     Discharge Recommendations:  rehabilitation facility   Discharge Equipment Recommendations: walker, rolling, wheelchair, bedside commode (TBD at next level of care) - WC over RW  Barriers to discharge: Decreased caregiver support and current mobility impairements    Assessment:     Juany Hairston is a 22 y.o. female admitted with a medical diagnosis of Overdose of antipsychotic, accidental or unintentional, initial encounter. Pmhx significant for bipolar, depression, WPW syndrome, and etoh use.  She presents with the following impairments/functional limitations:  impaired self care skills, impaired functional mobility, gait instability, impaired balance, impaired cognition, decreased upper extremity function, decreased lower extremity function, decreased safety awareness, pain, abnormal tone, impaired coordination, impaired fine motor, impaired cardiopulmonary response to activity.    Patient evaluated by PT and goals established. Patient with continued confusion and highly distractible but pleasant demeanor. Notable balance impairments with tremulous extremities and impaired coordination requiring Sil with BUE support for all standing activity. PT will continue to follow and progress as tolerated. Rec for dc to IRF - potential to progress if quick resolution of encephalopathy    Patient was independent at home prior to this event for locomotion with no AD, ADLs, and IADLs, including working and traveling. There is expectation of returning to prior level of function to maintain independence avoiding readmission.      Pt's clinical condition meets full inpatient rehab criteria. Inpatient rehab will provide to total interdisciplinary treatment approach needed. Pt is at high risk of unplanned readmission due to fall risk and ongoing medical care. The lower level of care cannot provide total  interdisciplinary approach needed.     Pt is able to tolerate 3 hours of daily therapy. Pt is pleasant and motivated to return to prior level of function.     Rehab Prognosis: Good; patient would benefit from acute skilled PT services to address these deficits and reach maximum level of function.    Recent Surgery: * No surgery found *      Plan:     During this hospitalization, patient to be seen 6 x/week to address the identified rehab impairments via gait training, therapeutic activities, therapeutic exercises, neuromuscular re-education and progress toward the following goals:    · Plan of Care Expires:  09/11/22    Subjective     Chief Complaint: Back pain and arm pain  Patient/Family Comments/goals: None clearly stated, agreeable to plan to getting better; Patient agreeable to evaluation.  Pain/Comfort:  · Pain Rating 1:  (unrated)  · Location - Orientation 1: lower  · Location 1: back (h/o spine fracture in 7th grade d/t [facility])  · Pain Addressed 1: Reposition, Distraction, Cessation of Activity, Nurse notified  · Pain Rating Post-Intervention 1:  (worsened with sitting without trunk support)    Patients cultural, spiritual, Cheondoism conflicts given the current situation: no    Living Environment:  · Pt lives with her SO in [place] - visiting [place] on vacation   Prior level of function:  · Ambulation: Indep  · ADL's: Indep  · IADLs: Indep  · Previously worked as   · Injured her back in [employer] in 7th grade (spinal fracture?) with ongoing back pain  · Equipment used at home: none.     Objective:     Communicated with ELOISE Morales prior to session.  Patient found supine with trunk flexed up off bed for semi reclined position with peripheral IV, telemetry, blood pressure cuff, pulse ox (continuous)  upon PT entry to room.    General Precautions: Standard, fall, seizure   Orthopedic Precautions:N/A   Braces: N/A  Respiratory Status: Room air    Patient donned red socks and gait belt for  OOB mobility.    Exams:  · Cognition:   · Patient is oriented to person and  only - not oriented to place, date.  · Pt follows approximately 50% of one step commands.    · Mood: Pleasant and cooperative, distractible and fluctuating level of attention/arousal  · Cognition: CAM-ICU+, answering questions inappropriately/with non sequitors   · Per psychiatry consult, apparent VHs  · Safety Awareness: Impaired  · Musculoskeletal:  · BMI: 21.14  · Posture:  Forward head, rounded shoulders, significant lumbar flexion with posterior pelvic tilt in sitting  · LE ROM/Strength:   · R ROM: WFL  · L ROM: WFL  · R Strength:   · Knee extension: 5/5  · Dorsiflexion: 5/5   · L Strength:   · Knee extension: 5/5  · Dorsiflexion: 5/5   · Neuromuscular:  · Sensation: LEDY d/t impaired cognition  · Tone/Reflexes: Tremulous, no hypertonicity noted    · Coordination: Generally impaired, poor distal coordination  · Balance:  · Static sitting: Poor+, frequent posterior LOB requiring Sil to return to upright  · Static standing: Fair-, requires BUE support and CGA-Sil to maintain standing, variable direction of sway  · Dynamic standing: Poor, requires BUE support and Sil to prevent LOB, variable direction of sway  · Visual-vestibular: Attempted - pt able to track movements, difficulty with further assessment d/t cognition  · Integument: Visible skin intact  · Cardiopulmonary:  · O2 Requirements: RA  · Vital signs:   · Pre(supine): /89  SpO2 100%  · During(standing): HR max 135  · Edema: None noted    Functional Mobility:  · Bed Mobility:     · Supine to Sit: contact guard assistance  · Sit to Supine: contact guard assistance  · Transfers:     · Sit to Stand:  minimum assistance with hand-held assist and rolling walker  · Gait: x20 ft with RW and Sil.   · Ataxic gait with narrowed step width (frequently stepping on stance foot), forwad flexion of trunk, and increased sway in all directions without appropriate postural  reactions.   · Attempted gait without AD, gait training performed with RW with improvement in gait stability.   · Balance:   · Static sitting EOB x5 min with >5 posterior LOB requiring support at trunk.   · maintains feet elevated off ground despite repeated cueing for LE support.   · Static and dynamic standing x3 min with HHA progressing to RW with Sil.     Therapeutic Activities and Exercises:  ·  Gait:  · Initiated gait training with RW with cueing for hand placement and sequencing  · Further gait deferred d/t increased back pain  · Will require reinforcement d/t cognition, SO instructed in use of RW with staff present for increased safety for OOB mobility  PT educated patient and SO re:   PT plan of care/role of PT  Safety with OOB mobility  Use of RW  Pt and SO verbalized understanding , pt requires reinforcement      AM-PAC 6 CLICK MOBILITY  Total Score:18     Patient left HOB elevated with all lines intact, call button in reach, RN and SO notified and OT Dot present.    GOALS:   Multidisciplinary Problems     Physical Therapy Goals        Problem: Physical Therapy    Goal Priority Disciplines Outcome Goal Variances Interventions   Physical Therapy Goal     PT, PT/OT Ongoing, Progressing     Description: Goals to be met by: 22    Patient will increase functional independence with mobility by performin. Supine<>sit without use of hospital bed features.  2. Sit<>stand with supervision with LRAD.  3. Gait x 300 feet with supervision with LRAD.  4. Narrow stance x30 sec without UE support with SBA and no LOB.  5. Ascend/descend 4 step(s) with least restrictive assistive device and uni HR with supervision.                    History:     Past Medical History:   Diagnosis Date    Bipolar affective disorder, currently active 2019    As per pt's mother Maria Isabel [phone]    Depression 2019    As per pt's mother's account    Ibuprofen overdose 2018    As per pt's mother's account    WPW  (Kory-Parkinson-White syndrome) 2018    As per pt's mother's account       History reviewed. No pertinent surgical history.    Time Tracking:     PT Received On: 08/12/22  PT Start Time: 1034     PT Stop Time: 1054  PT Total Time (min): 20 min     Overlap with OT for portions of session due to complex nature of patient, tolerance for therapeutic modalities, and safety with mobility to decrease fall risk for patient and caregiver injury requiring two skilled therapists to provide interventions.    Billable Minutes: Evaluation 10 and Gait Training 8      08/12/2022

## 2022-08-12 NOTE — PROGRESS NOTES
"[facility] Intensive Care ([facility] Medicine  Progress Note    Patient Name: Juany Hairston  MRN: [id number]  Patient Class: IP- Inpatient   Admission Date: 8/11/2022  Length of Stay: 1 days  Attending Physician: Gaetano Robert MD        Subjective:     Principal Problem:Overdose of antipsychotic, accidental or unintentional, initial encounter        HPI:  Per Vanda Hoang, NP:    "HPI is taken from pt's boyfriend Sravani Kathleen at bedside as patient is currently disoriented.     Juany Hairston is a 22 year old lady with a PMHx of bipolar and depression noncompliant to medication, WPW syndrome and heavy alcohol use who was brought into the ED via EMS who was activated by her boyfriend for a witnessed tonic clonic seizure. Pt's boyfriend reports that he found pt awake this morning at 7.30 am PTA being very fidgety, paranoid with hyperarousal, which was different from her baseline behavior. Pt's boyfriend then witnessed a tonic clonic seizure lasting 20-25s on the bed with no other sustained injuries. He reports not knowing if patient bit her tongue. Patient's boyfriend noticed that her pill box containing at least 15 pills of 50mg seroquel and at least 15 pills of 100mg seroquel were empty. Pt's boyfriend had prepared her pill box on Sunday prior to her travel to [place]. He reports that pt's other medications such as vitamin C and K pills (for bruising) were intact. Pt's boyfriend reports that she does not take her prescribed medications daily.     Patient's boyfriend reports that she was visiting from out of state and she is residing in [place]. Pt was last well at 10pm last night after returning home from dinner and drinks.     Patient had a seizure in the ED and was given Lorazepam 2mg IV. CT brain was negative. Workup shows increased anion gap with otherwise no metabolic abnormalities. UDS -ve, serum tylenol and etoh negative. EKG was positive for WPW. She is admitted to [facility] medicine for " "seizure risk and altered mental state. "      Overview/Hospital Course:  Patient is a 22 year-old woman with history of depression, bipolar disorder, alcohol abuse admitted to the hospital following quetiapine overdose and alcohol withdrawal possible Wernicke's encephalopathy and seizures.  Patient also evidence of metabolic acidosis.  Clinically improving with intravenous thiamine, benzodiazepines, and intravenous fluids.  Overall clinically improved with less tremor and hyperreflexia but still making incoherent statements at times and tangential.      Interval History: No further seizures.  Tachycardia improving.  Less hyperreflexic.  Mental status improving.    Review of Systems   Constitutional:  Negative for chills and fever.   Respiratory:  Negative for shortness of breath.    Cardiovascular:  Negative for chest pain.   Gastrointestinal:  Negative for abdominal pain, nausea and vomiting.   Genitourinary:  Negative for dysuria and frequency.   Psychiatric/Behavioral:  Positive for confusion.      Objective:     Vital Signs (Most Recent):  Temp: 98.5 °F (36.9 °C) (08/12/22 0305)  Pulse: 104 (08/12/22 0626)  Resp: (!) 25 (08/12/22 0626)  BP: (!) 132/100 (08/12/22 0626)  SpO2: 98 % (08/12/22 0626)   Vital Signs (24h Range):  Temp:  [97.5 °F (36.4 °C)-98.5 °F (36.9 °C)] 98.5 °F (36.9 °C)  Pulse:  [] 104  Resp:  [17-59] 25  SpO2:  [91 %-100 %] 98 %  BP: (122-151)/() 132/100     Weight: 61.2 kg (135 lb)  Body mass index is 21.14 kg/m².    Intake/Output Summary (Last 24 hours) at 8/12/2022 0717  Last data filed at 8/12/2022 0642  Gross per 24 hour   Intake 2468.39 ml   Output 500 ml   Net 1968.39 ml      Physical Exam  Constitutional:       General: She is not in acute distress.  HENT:      Head: Atraumatic.   Eyes:      Conjunctiva/sclera: Conjunctivae normal.   Cardiovascular:      Rate and Rhythm: Regular rhythm. Tachycardia present.      Heart sounds: Normal heart sounds. No murmur heard.  Pulmonary: "      Effort: Pulmonary effort is normal.      Breath sounds: Normal breath sounds. No wheezing.   Abdominal:      General: Bowel sounds are normal. There is no distension.      Palpations: Abdomen is soft.      Tenderness: There is no abdominal tenderness.   Musculoskeletal:         General: No deformity. Normal range of motion.      Cervical back: Neck supple.   Neurological:      Mental Status: She is alert. She is disoriented.      Deep Tendon Reflexes: Reflexes abnormal.      Comments: Patient able to state the year but not oriented to place or situation.  Tangential but redirectable.       Significant Labs: All pertinent labs within the past 24 hours have been reviewed.    Significant Imaging: I have reviewed all pertinent imaging results/findings within the past 24 hours.      Assessment/Plan:      * Overdose of antipsychotic, accidental or unintentional, initial encounter  Slowly improving.  No further seizures.  Suspect presentation secondary to combination of quetiapine overdose (unclear of intentional) and alcohol intoxication/withdrawal with metabolic encephalopathy and Wernicke's encephalopathy.  Clinically improving with supportive care including as-needed benzodiazepines and intravenous thiamine.  Will continue current measures.  Patient states that she believes she took extra quetiapine yesterday but when asked why she does not answer.  Consult Psychiatry to evaluate for possible risk of further self-harm.    Acute metabolic encephalopathy  Secondary to quetiapine overdose, alcohol abuse/withdrawal, working cephalopathy, metabolic acidosis.  Clinically improving with current therapy.  Will continue.    Alcohol withdrawal syndrome without complication  Reported heavy ongoing alcohol use.  Evidence of chronic liver disease with macrocytosis and thrombocytopenia.  Patient counseled importance of staying further alcohol abuse.  Continue intravenous thiamine and as needed  benzodiazepines.    Depression  Pharmacy refill records does not coincide with patients reported dosage of quetiapine.  Consult Psychiatry for evaluation further treatment recommendations.    VTE Risk Mitigation (From admission, onward)         Ordered     IP VTE LOW RISK PATIENT  Once         08/11/22 1357     Place sequential compression device  Until discontinued         08/11/22 1357              Gaetano Robert MD  Department of Hospital Medicine   [facility] - Intensive Care ([place])

## 2022-08-12 NOTE — PLAN OF CARE
No seizure activity observed over night prn ativan given x1 over night for increased anxiety and pt not able to be redirected not much relief achieved.  Significant other at bedside at pt bedside throughout night redirecting tending to pt.  LR at 100ml/hr.  Pt remained NPO except with meds and small sips of water tolerated well.  Pregnancy test done negative results charted.  Updated family over the phone.  Pt did not sleep at all over night very fidgety and and hyper sensitive to stimulus seemed to has gotted mildly better over night.  Pt still only oriented to self but has had some moments of clarity can recall accurate personal information.  Mostly inappropriate and delayed slurred verbals responses but can follow simple commands.  VSS over night.  Could potentially be transferred today out of the ICU

## 2022-08-12 NOTE — PLAN OF CARE
CM unable to complete assesent, pt is sedated. Will attempt at a later time.    [facility] - Intensive Care ([place])  Initial Discharge Assessment       Primary Care Provider: Primary Doctor No    Admission Diagnosis: Altered mental status [R41.82]  Acute metabolic encephalopathy [G93.41]    Admission Date: 8/11/2022  Expected Discharge Date:          Payor: /     Extended Emergency Contact Information  Primary Emergency Contact: Sravani Kathleen  Mobile Phone: [phone]  Relation: Significant other  Preferred language: English  Secondary Emergency Contact: yeimi Hairston  Mobile Phone: [phone]  Relation: Mother  Preferred language: English   needed? No            No Pharmacies Listed    Initial Assessment (most recent)       Adult Discharge Assessment - 08/12/22 1621          Discharge Assessment    Assessment Type Discharge Planning Assessment (P)      Source of Information other (see comments) (P)    pt sedated, unable to assess at this time

## 2022-08-12 NOTE — SUBJECTIVE & OBJECTIVE
Interval History: No further seizures.  Tachycardia improving.  Less hyperreflexic.  Mental status improving.    Review of Systems   Constitutional:  Negative for chills and fever.   Respiratory:  Negative for shortness of breath.    Cardiovascular:  Negative for chest pain.   Gastrointestinal:  Negative for abdominal pain, nausea and vomiting.   Genitourinary:  Negative for dysuria and frequency.   Psychiatric/Behavioral:  Positive for confusion.      Objective:     Vital Signs (Most Recent):  Temp: 98.5 °F (36.9 °C) (08/12/22 0305)  Pulse: 104 (08/12/22 0626)  Resp: (!) 25 (08/12/22 0626)  BP: (!) 132/100 (08/12/22 0626)  SpO2: 98 % (08/12/22 0626)   Vital Signs (24h Range):  Temp:  [97.5 °F (36.4 °C)-98.5 °F (36.9 °C)] 98.5 °F (36.9 °C)  Pulse:  [] 104  Resp:  [17-59] 25  SpO2:  [91 %-100 %] 98 %  BP: (122-151)/() 132/100     Weight: 61.2 kg (135 lb)  Body mass index is 21.14 kg/m².    Intake/Output Summary (Last 24 hours) at 8/12/2022 0717  Last data filed at 8/12/2022 0642  Gross per 24 hour   Intake 2468.39 ml   Output 500 ml   Net 1968.39 ml      Physical Exam  Constitutional:       General: She is not in acute distress.  HENT:      Head: Atraumatic.   Eyes:      Conjunctiva/sclera: Conjunctivae normal.   Cardiovascular:      Rate and Rhythm: Regular rhythm. Tachycardia present.      Heart sounds: Normal heart sounds. No murmur heard.  Pulmonary:      Effort: Pulmonary effort is normal.      Breath sounds: Normal breath sounds. No wheezing.   Abdominal:      General: Bowel sounds are normal. There is no distension.      Palpations: Abdomen is soft.      Tenderness: There is no abdominal tenderness.   Musculoskeletal:         General: No deformity. Normal range of motion.      Cervical back: Neck supple.   Neurological:      Mental Status: She is alert. She is disoriented.      Deep Tendon Reflexes: Reflexes abnormal.      Comments: Patient able to state the year but not oriented to place or  situation.  Tangential but redirectable.       Significant Labs: All pertinent labs within the past 24 hours have been reviewed.    Significant Imaging: I have reviewed all pertinent imaging results/findings within the past 24 hours.

## 2022-08-12 NOTE — HOSPITAL COURSE
Patient is a 22 year-old woman with history of depression, bipolar disorder, alcohol abuse admitted to the hospital following quetiapine overdose and alcohol withdrawal possible Wernicke's encephalopathy and seizures.  Patient also evidence of metabolic acidosis.  Clinically improving with intravenous thiamine, benzodiazepines, and intravenous fluids.  Overall clinically improved with less tremor and hyperreflexia but still making incoherent statements at times and tangential.  Psychiatry consulted recommended scheduled divalproex along wit PRN doses as needed.  Patient remained significantly agitated and started on dexmedetomidine infusion.  Dexamethasone infusion weaned off and patient's confusion resolved.  Patient now alert and oriented and following commands appropriately.  Psychiatry reconsulted to evaluate for possible risk of some self-harm in light of recent intentional drug overdose.  Patient denied any suicidal ideation at this time.  Psychiatrist recommended against inpatient psychiatric treatment at this time bedside recommended outpatient treatment which patient is willing to undergo.  Patient discharged in stable condition with family with close outpatient psychiatric treatment advised.  No psychiatric medications recommended at this time.

## 2022-08-12 NOTE — PLAN OF CARE
Problem: Physical Therapy  Goal: Physical Therapy Goal  Description: Goals to be met by: 22    Patient will increase functional independence with mobility by performin. Supine<>sit without use of hospital bed features.  2. Sit<>stand with supervision with LRAD.  3. Gait x 300 feet with supervision with LRAD.  4. Narrow stance x30 sec without UE support with SBA and no LOB.  5. Ascend/descend 4 step(s) with least restrictive assistive device and uni HR with supervision.   Outcome: Ongoing, Progressing     Patient evaluated by PT and goals established. Patient with continued confusion and highly distractible but pleasant demeanor. Notable balance impairments with tremulous extremities and impaired coordination requiring Sil with BUE support for all standing activity. PT will continue to follow and progress as tolerated. Rec for dc to IRF - potential to progress if quick resolution of encephalopathy. Please see progress note for detailed plan of care and recommendations.

## 2022-08-12 NOTE — ASSESSMENT & PLAN NOTE
Secondary to quetiapine overdose, alcohol abuse/withdrawal, working cephalopathy, metabolic acidosis.  Clinically improving with current therapy.  Will continue.
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
## 2022-08-13 LAB
ALBUMIN SERPL BCP-MCNC: 3.8 G/DL (ref 3.5–5.2)
ALP SERPL-CCNC: 117 U/L (ref 55–135)
ALT SERPL W/O P-5'-P-CCNC: 51 U/L (ref 10–44)
ANION GAP SERPL CALC-SCNC: 12 MMOL/L (ref 8–16)
AST SERPL-CCNC: 61 U/L (ref 10–40)
BASOPHILS # BLD AUTO: 0.03 K/UL (ref 0–0.2)
BASOPHILS NFR BLD: 0.6 % (ref 0–1.9)
BILIRUB SERPL-MCNC: 0.8 MG/DL (ref 0.1–1)
BUN SERPL-MCNC: 5 MG/DL (ref 6–20)
CALCIUM SERPL-MCNC: 9.1 MG/DL (ref 8.7–10.5)
CHLORIDE SERPL-SCNC: 104 MMOL/L (ref 95–110)
CO2 SERPL-SCNC: 23 MMOL/L (ref 23–29)
CREAT SERPL-MCNC: 0.7 MG/DL (ref 0.5–1.4)
DIFFERENTIAL METHOD: ABNORMAL
EOSINOPHIL # BLD AUTO: 0.1 K/UL (ref 0–0.5)
EOSINOPHIL NFR BLD: 2.3 % (ref 0–8)
ERYTHROCYTE [DISTWIDTH] IN BLOOD BY AUTOMATED COUNT: 12.5 % (ref 11.5–14.5)
EST. GFR  (NO RACE VARIABLE): >60 ML/MIN/1.73 M^2
GLUCOSE SERPL-MCNC: 109 MG/DL (ref 70–110)
HCT VFR BLD AUTO: 35.7 % (ref 37–48.5)
HGB BLD-MCNC: 13.2 G/DL (ref 12–16)
IMM GRANULOCYTES # BLD AUTO: 0.02 K/UL (ref 0–0.04)
IMM GRANULOCYTES NFR BLD AUTO: 0.4 % (ref 0–0.5)
LYMPHOCYTES # BLD AUTO: 1 K/UL (ref 1–4.8)
LYMPHOCYTES NFR BLD: 21.2 % (ref 18–48)
MAGNESIUM SERPL-MCNC: 1.6 MG/DL (ref 1.6–2.6)
MCH RBC QN AUTO: 36 PG (ref 27–31)
MCHC RBC AUTO-ENTMCNC: 37 G/DL (ref 32–36)
MCV RBC AUTO: 97 FL (ref 82–98)
MONOCYTES # BLD AUTO: 0.4 K/UL (ref 0.3–1)
MONOCYTES NFR BLD: 7.9 % (ref 4–15)
NEUTROPHILS # BLD AUTO: 3.3 K/UL (ref 1.8–7.7)
NEUTROPHILS NFR BLD: 67.6 % (ref 38–73)
NRBC BLD-RTO: 0 /100 WBC
PHOSPHATE SERPL-MCNC: 3.6 MG/DL (ref 2.7–4.5)
PLATELET # BLD AUTO: 98 K/UL (ref 150–450)
PMV BLD AUTO: 8.5 FL (ref 9.2–12.9)
POTASSIUM SERPL-SCNC: 3.8 MMOL/L (ref 3.5–5.1)
PROT SERPL-MCNC: 6.6 G/DL (ref 6–8.4)
RBC # BLD AUTO: 3.67 M/UL (ref 4–5.4)
SODIUM SERPL-SCNC: 139 MMOL/L (ref 136–145)
WBC # BLD AUTO: 4.81 K/UL (ref 3.9–12.7)

## 2022-08-13 PROCEDURE — 80053 COMPREHEN METABOLIC PANEL: CPT | Performed by: HOSPITALIST

## 2022-08-13 PROCEDURE — 25000003 PHARM REV CODE 250: Performed by: NURSE PRACTITIONER

## 2022-08-13 PROCEDURE — 99233 SBSQ HOSP IP/OBS HIGH 50: CPT | Mod: ,,, | Performed by: HOSPITALIST

## 2022-08-13 PROCEDURE — 25000003 PHARM REV CODE 250

## 2022-08-13 PROCEDURE — 83735 ASSAY OF MAGNESIUM: CPT | Performed by: HOSPITALIST

## 2022-08-13 PROCEDURE — 99233 PR SUBSEQUENT HOSPITAL CARE,LEVL III: ICD-10-PCS | Mod: ,,, | Performed by: HOSPITALIST

## 2022-08-13 PROCEDURE — 36415 COLL VENOUS BLD VENIPUNCTURE: CPT | Performed by: HOSPITALIST

## 2022-08-13 PROCEDURE — 63600175 PHARM REV CODE 636 W HCPCS: Performed by: HOSPITALIST

## 2022-08-13 PROCEDURE — 85025 COMPLETE CBC W/AUTO DIFF WBC: CPT | Performed by: HOSPITALIST

## 2022-08-13 PROCEDURE — 84100 ASSAY OF PHOSPHORUS: CPT | Performed by: HOSPITALIST

## 2022-08-13 PROCEDURE — 63600175 PHARM REV CODE 636 W HCPCS

## 2022-08-13 PROCEDURE — 25000003 PHARM REV CODE 250: Performed by: HOSPITALIST

## 2022-08-13 PROCEDURE — 20000000 HC ICU ROOM

## 2022-08-13 RX ORDER — LANOLIN ALCOHOL/MO/W.PET/CERES
400 CREAM (GRAM) TOPICAL ONCE
Status: COMPLETED | OUTPATIENT
Start: 2022-08-13 | End: 2022-08-13

## 2022-08-13 RX ADMIN — DEXMEDETOMIDINE HYDROCHLORIDE 0.8 MCG/KG/HR: 4 INJECTION, SOLUTION INTRAVENOUS at 05:08

## 2022-08-13 RX ADMIN — Medication 400 MG: at 06:08

## 2022-08-13 RX ADMIN — THIAMINE HYDROCHLORIDE 500 MG: 100 INJECTION, SOLUTION INTRAMUSCULAR; INTRAVENOUS at 09:08

## 2022-08-13 RX ADMIN — DIVALPROEX SODIUM 500 MG: 250 TABLET, DELAYED RELEASE ORAL at 08:08

## 2022-08-13 RX ADMIN — Medication 6 MG: at 09:08

## 2022-08-13 RX ADMIN — SODIUM CHLORIDE, SODIUM LACTATE, POTASSIUM CHLORIDE, AND CALCIUM CHLORIDE: .6; .31; .03; .02 INJECTION, SOLUTION INTRAVENOUS at 01:08

## 2022-08-13 RX ADMIN — LORAZEPAM 2 MG: 1 TABLET ORAL at 12:08

## 2022-08-13 RX ADMIN — MUPIROCIN: 20 OINTMENT TOPICAL at 08:08

## 2022-08-13 RX ADMIN — THIAMINE HYDROCHLORIDE 500 MG: 100 INJECTION, SOLUTION INTRAMUSCULAR; INTRAVENOUS at 04:08

## 2022-08-13 NOTE — ASSESSMENT & PLAN NOTE
Reported heavy ongoing alcohol use.  Evidence of chronic liver disease with macrocytosis and thrombocytopenia.  Patient counseled importance of staying further alcohol abuse.  Continue intravenous thiamine and monitor for withdrawal.

## 2022-08-13 NOTE — SUBJECTIVE & OBJECTIVE
Interval History: Rested comfortably overnight with dexmedetomidine infusion however overly sedated this morning.    Review of Systems   Unable to perform ROS: Other (Sedated)     Objective:     Vital Signs (Most Recent):  Temp: 98.7 °F (37.1 °C) (08/13/22 0300)  Pulse: 60 (08/13/22 0600)  Resp: 13 (08/13/22 0600)  BP: (!) 134/93 (08/13/22 0600)  SpO2: 99 % (08/13/22 0600)   Vital Signs (24h Range):  Temp:  [97.7 °F (36.5 °C)-98.7 °F (37.1 °C)] 98.7 °F (37.1 °C)  Pulse:  [] 60  Resp:  [11-42] 13  SpO2:  [90 %-100 %] 99 %  BP: (127-142)/() 134/93     Weight: 61.2 kg (135 lb)  Body mass index is 21.14 kg/m².    Intake/Output Summary (Last 24 hours) at 8/13/2022 0849  Last data filed at 8/13/2022 0600  Gross per 24 hour   Intake 1634.62 ml   Output 1600 ml   Net 34.62 ml        Physical Exam  Constitutional:       General: She is not in acute distress.  HENT:      Head: Atraumatic.   Eyes:      Conjunctiva/sclera: Conjunctivae normal.   Cardiovascular:      Rate and Rhythm: Normal rate and regular rhythm.      Heart sounds: Normal heart sounds. No murmur heard.  Pulmonary:      Effort: Pulmonary effort is normal.      Breath sounds: Normal breath sounds. No wheezing.   Abdominal:      General: Bowel sounds are normal. There is no distension.      Palpations: Abdomen is soft.      Tenderness: There is no abdominal tenderness.   Musculoskeletal:         General: No deformity. Normal range of motion.      Cervical back: Neck supple.   Neurological:      Comments: Arousable but overly sedated this morning       Significant Labs: All pertinent labs within the past 24 hours have been reviewed.    Significant Imaging: I have reviewed all pertinent imaging results/findings within the past 24 hours.

## 2022-08-13 NOTE — PROGRESS NOTES
"[facility] Intensive Care ([facility] Medicine  Progress Note    Patient Name: Juany Hairston  MRN: [id number]  Patient Class: IP- Inpatient   Admission Date: 8/11/2022  Length of Stay: 2 days  Attending Physician: Gaetano Robert MD        Subjective:     Principal Problem:Overdose of antipsychotic, accidental or unintentional, initial encounter        HPI:  Per Vanda Hoang, NP:    "HPI is taken from pt's boyfriend Sravani Kathleen at bedside as patient is currently disoriented.     Juany Hairston is a 22 year old lady with a PMHx of bipolar and depression noncompliant to medication, WPW syndrome and heavy alcohol use who was brought into the ED via EMS who was activated by her boyfriend for a witnessed tonic clonic seizure. Pt's boyfriend reports that he found pt awake this morning at 7.30 am PTA being very fidgety, paranoid with hyperarousal, which was different from her baseline behavior. Pt's boyfriend then witnessed a tonic clonic seizure lasting 20-25s on the bed with no other sustained injuries. He reports not knowing if patient bit her tongue. Patient's boyfriend noticed that her pill box containing at least 15 pills of 50mg seroquel and at least 15 pills of 100mg seroquel were empty. Pt's boyfriend had prepared her pill box on Sunday prior to her travel to [place]. He reports that pt's other medications such as vitamin C and K pills (for bruising) were intact. Pt's boyfriend reports that she does not take her prescribed medications daily.     Patient's boyfriend reports that she was visiting from out of state and she is residing in [place]. Pt was last well at 10pm last night after returning home from dinner and drinks.     Patient had a seizure in the ED and was given Lorazepam 2mg IV. CT brain was negative. Workup shows increased anion gap with otherwise no metabolic abnormalities. UDS -ve, serum tylenol and etoh negative. EKG was positive for WPW. She is [facility] for " "seizure risk and altered mental state. "      Overview/Hospital Course:  Patient is a 22 year-old woman with history of depression, bipolar disorder, alcohol abuse admitted to the hospital following quetiapine overdose and alcohol withdrawal possible Wernicke's encephalopathy and seizures.  Patient also evidence of metabolic acidosis.  Clinically improving with intravenous thiamine, benzodiazepines, and intravenous fluids.  Overall clinically improved with less tremor and hyperreflexia but still making incoherent statements at times and tangential.    Psychiatry consulted recommended scheduled divalproex along wit PRN doses as needed.  Patient remained significantly agitated and started on dexmedetomidine infusion.         Interval History: Rested comfortably overnight with dexmedetomidine infusion however overly sedated this morning.    Review of Systems   Unable to perform ROS: Other (Sedated)     Objective:     Vital Signs (Most Recent):  Temp: 98.7 °F (37.1 °C) (08/13/22 0300)  Pulse: 60 (08/13/22 0600)  Resp: 13 (08/13/22 0600)  BP: (!) 134/93 (08/13/22 0600)  SpO2: 99 % (08/13/22 0600)   Vital Signs (24h Range):  Temp:  [97.7 °F (36.5 °C)-98.7 °F (37.1 °C)] 98.7 °F (37.1 °C)  Pulse:  [] 60  Resp:  [11-42] 13  SpO2:  [90 %-100 %] 99 %  BP: (127-142)/() 134/93     Weight: 61.2 kg (135 lb)  Body mass index is 21.14 kg/m².    Intake/Output Summary (Last 24 hours) at 8/13/2022 0849  Last data filed at 8/13/2022 0600  Gross per 24 hour   Intake 1634.62 ml   Output 1600 ml   Net 34.62 ml        Physical Exam  Constitutional:       General: She is not in acute distress.  HENT:      Head: Atraumatic.   Eyes:      Conjunctiva/sclera: Conjunctivae normal.   Cardiovascular:      Rate and Rhythm: Normal rate and regular rhythm.      Heart sounds: Normal heart sounds. No murmur heard.  Pulmonary:      Effort: Pulmonary effort is normal.      Breath sounds: Normal breath sounds. No wheezing.   Abdominal:      " General: Bowel sounds are normal. There is no distension.      Palpations: Abdomen is soft.      Tenderness: There is no abdominal tenderness.   Musculoskeletal:         General: No deformity. Normal range of motion.      Cervical back: Neck supple.   Neurological:      Comments: Arousable but overly sedated this morning       Significant Labs: All pertinent labs within the past 24 hours have been reviewed.    Significant Imaging: I have reviewed all pertinent imaging results/findings within the past 24 hours.      Assessment/Plan:      * Overdose of antipsychotic, accidental or unintentional, initial encounter  Despite divalproex patient quite agitated yesterday and started on dexmedetomidine infusion.  Wean off dexmedetomidine this morning to reassess mental status.  In addition to quetiapine overdose acute encephalopathy and seizure also related to alcoholism. Continue to treat for Wernicke's encephalopathy with intravenous thiamine.  If persistently altered will consult neurology and check EEG.    Acute metabolic encephalopathy  Secondary to quetiapine overdose, alcohol abuse/withdrawal, working cephalopathy, metabolic acidosis.  Wean off dexmedetomidine to reassess mental status now off any sedation.    Alcohol withdrawal syndrome without complication  Reported heavy ongoing alcohol use.  Evidence of chronic liver disease with macrocytosis and thrombocytopenia.  Patient counseled importance of staying further alcohol abuse.  Continue intravenous thiamine and monitor for withdrawal.    Depression  Pharmacy refill records does not coincide with patients reported dosage of quetiapine.  Consult Psychiatry for evaluation further treatment recommendations.    VTE Risk Mitigation (From admission, onward)         Ordered     IP VTE LOW RISK PATIENT  Once         08/11/22 1357     Place sequential compression device  Until discontinued         08/11/22 1357                Gaetano Robert MD  Department of Hospital  Medicine   [facility] - Intensive Care ([facility])

## 2022-08-13 NOTE — PLAN OF CARE
Problem: Occupational Therapy  Goal: Occupational Therapy Goal  Description: Goals to be met by: 8/20/22    Patient will increase functional independence with ADLs by performing:    Grooming at Set up level sitting EOB.  UB Dressing at Set up level.  Donning underwear/pants at CGA level with verbal cues.  Donning socks at CGA level.  Toileting at Min A level.  Toilet/BSC transfers at CGA level.    Outcome: Ongoing, Progressing   Evaluation complete.  Pt needing IPR with OT, PT and SLP services.  Pt meets full criteria for IPR and expected to return to Indep level.

## 2022-08-13 NOTE — ASSESSMENT & PLAN NOTE
Secondary to quetiapine overdose, alcohol abuse/withdrawal, working cephalopathy, metabolic acidosis.  Wean off dexmedetomidine to reassess mental status now off any sedation.

## 2022-08-13 NOTE — PT/OT/SLP PROGRESS
Physical Therapy      Patient Name:  Juany Hairston   MRN:  [id number]    Patient not seen today secondary to Unarousable, noted in MD notes that pt given medication for agitation. Upon arrival pt sleeping with hand in breakfast tray, did not open eyes and barley engaged with therapist questions. Nursing aware, vitals normal. Will follow up as pt more alert to safely participate in therapy sessions.

## 2022-08-13 NOTE — PLAN OF CARE
VSS; pt is A&Ox4; Boyfriend has been at bedside all day. Suicide risk assessment performed Physician is aware of data. Awaiting Tele Psych consult to complete assessment. Pt is off percedex drip and since has been very alert and able to get to bedside commode multiple times during the shift. Pt is noted to have periods of tachycardia with activity. Per patient and Boyfriend pt baseline is 90's -110's. Physical therapy not completed today.

## 2022-08-13 NOTE — PT/OT/SLP EVAL
"Occupational Therapy   Evaluation and Treatment    Name: Juany Hairston  MRN: [id number]  Admitting Diagnosis:  Overdose of antipsychotic, accidental or unintentional, initial encounter  Recent Surgery: * No surgery found *      Recommendations:     Discharge Recommendations: rehabilitation facility  Discharge Equipment Recommendations:   (Defer to next level of care)  Barriers to discharge:  Decreased caregiver support (Current level of function,.)    Assessment:     Juany Hairston is a 22 y.o. female with a medical diagnosis of Overdose of antipsychotic, accidental or unintentional, initial encounter.  She presents alert, disoriented, confused, and fidgety.   Pt able to give some  information about herself, her job, her boyfriend's name, and where she lives but unaware she is in [place] or why she came here.  Pt is pleasant and cooperative but needing redirection throughout tasks.  Pt had a few episodes of  suddenly "zoning out" and not responding at all during OT session.  Pt's nurse reporting that pt has not slept.  Pt demonstrating impaired balance, motor planning, and having tremulous UE/LE movements.  Pt with poor to no insight into deficits and needing assistance with all ADL and ADL mobility.   Performance deficits affecting function: impaired endurance, impaired self care skills, impaired functional mobility, gait instability, impaired cognition, impaired balance, decreased upper extremity function, decreased lower extremity function, abnormal tone, decreased safety awareness, pain, impaired coordination, impaired fine motor, impaired cardiopulmonary response to activity.        Pt is needing IPR with OT, PT and SLP.  She meets full criteria and able to tolerate 3 hours of therapy a day.  Pt is motivated and is expected to return to Indep level of function.  A ;ower level of care would not be appropriate for the intensive interdisciplinary approach needed for pt recovery.      Rehab Prognosis: Good; " patient would benefit from acute skilled OT services to address these deficits and reach maximum level of function.       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     Occupational Profile:  Living Environment: Lives with boyfriend in [place]  Previous level of function: Indep, drinks alcohol daily per chart  Roles and Routines: Daughter, girlfriend, worked as a  in a restaurant, enjoys traveling  Equipment Used at Home:  none  Assistance upon Discharge: Boyfriend but unsure of how much assist he can provide due to working.  Medical staff has been in touch with pt's mother by phone.     Pain/Comfort:  · Pain Rating 1:  (Unrated)  · Location - Orientation 1: lower  · Location 1: back  · Pain Addressed 1: Reposition, Distraction, Cessation of Activity  · Pain Rating Post-Intervention 1:  (Unrated)    Patients cultural, spiritual, [facility]ism conflicts given the current situation: no    Objective:     Communicated with: nurseCarmen, prior to session.  Patient found HOB elevated in ICU room with blood pressure cuff, pulse ox (continuous), telemetry, peripheral IV upon OT entry to room.    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 Pt able to  and sip mouthwash but needing close supervision for safety.  Pt not responding to verbal or physical cues to brush teeth, even after toothbrush introduced into her mouth   · UB Dressing: Max A for donning a second hospital gown as a robe      Cognitive/Visual Perceptual:  Cognitive/Psychosocial Skills:     -       Oriented to: Person, place, answering month was "July or August", thought she was in [facility]  -       Follows Commands/attention:Inattentive, Easily distracted and 50% of commands  -       Communication: clear/fluent  -       Memory: Impaired  -       Safety awareness/insight to disability: impaired   -       Mood/Affect/Coping skills/emotional control: Cooperative and Pleasant  -       Vision: Unable to follow commands for visual perception evaluation; able to track by observation    Physical Exam:  Sitting Balance: Poor+; loosing balance posteriorly  Standing Balance: Fair- for static; Poor for dynamic; use of RW and needing physical support, Ataxia; tremors  UE AROM: WFL for self care  UE Strength: 4/5  UE Coordination; Impaired gross and fine motor; tremors  Skin: Visible skin intact  Sensation: NT but no deficits by observation  Edema: None noted      AMPAC 6 Click ADL:  AMPAC Total Score: 12    Treatment & Education:  Role of OT, POC, safety with ADL and ADL mobility    Education:    Patient left HOB elevated with all lines intact, call button in reach, nurse notified and boyfriend present    GOALS:   Multidisciplinary Problems     Occupational Therapy Goals        Problem: Occupational Therapy    Goal Priority Disciplines Outcome Interventions   Occupational Therapy Goal     OT, PT/OT Ongoing, Progressing    Description: Goals to " be met by: 8/20/22    Patient will increase functional independence with ADLs by performing:    Grooming at Set up level sitting EOB.  UB Dressing at Set up level.  Donning underwear/pants at CGA level with verbal cues.  Donning socks at CGA level.  Toileting at Min A level.  Toilet/BSC transfers at CGA level.                     History:     Past Medical History:   Diagnosis Date    Bipolar affective disorder, currently active 2019    As per pt's mother Maria Isabel [phone]    Depression 2019    As per pt's mother's account    Ibuprofen overdose 2018    As per pt's mother's account    WPW (Kory-Parkinson-White syndrome) 2018    As per pt's mother's account       History reviewed. No pertinent surgical history.    Time Tracking:     OT Date of Treatment: 08/12/22  OT Start Time: 1034  OT Stop Time: 1100  OT Total Time (min): 26 min    Billable Minutes:Evaluation 12  Self Care/Home Management 8    8/12/2022

## 2022-08-13 NOTE — ASSESSMENT & PLAN NOTE
Despite divalproex patient quite agitated yesterday and started on dexmedetomidine infusion.  Wean off dexmedetomidine this morning to reassess mental status.  In addition to quetiapine overdose acute encephalopathy and seizure also related to alcoholism. Continue to treat for Wernicke's encephalopathy with intravenous thiamine.  If persistently altered will consult neurology and check EEG.

## 2022-08-14 VITALS
SYSTOLIC BLOOD PRESSURE: 120 MMHG | OXYGEN SATURATION: 97 % | RESPIRATION RATE: 26 BRPM | DIASTOLIC BLOOD PRESSURE: 82 MMHG | HEART RATE: 106 BPM | TEMPERATURE: 98 F | HEIGHT: 69 IN | WEIGHT: 141.31 LBS | BODY MASS INDEX: 20.93 KG/M2

## 2022-08-14 LAB
ALBUMIN SERPL BCP-MCNC: 3.8 G/DL (ref 3.5–5.2)
ALP SERPL-CCNC: 119 U/L (ref 55–135)
ALT SERPL W/O P-5'-P-CCNC: 46 U/L (ref 10–44)
ANION GAP SERPL CALC-SCNC: 12 MMOL/L (ref 8–16)
AST SERPL-CCNC: 51 U/L (ref 10–40)
BASOPHILS # BLD AUTO: 0.04 K/UL (ref 0–0.2)
BASOPHILS NFR BLD: 0.8 % (ref 0–1.9)
BILIRUB SERPL-MCNC: 0.8 MG/DL (ref 0.1–1)
BUN SERPL-MCNC: 6 MG/DL (ref 6–20)
CALCIUM SERPL-MCNC: 9.3 MG/DL (ref 8.7–10.5)
CHLORIDE SERPL-SCNC: 102 MMOL/L (ref 95–110)
CO2 SERPL-SCNC: 23 MMOL/L (ref 23–29)
CREAT SERPL-MCNC: 0.8 MG/DL (ref 0.5–1.4)
DIFFERENTIAL METHOD: ABNORMAL
EOSINOPHIL # BLD AUTO: 0.1 K/UL (ref 0–0.5)
EOSINOPHIL NFR BLD: 1.4 % (ref 0–8)
ERYTHROCYTE [DISTWIDTH] IN BLOOD BY AUTOMATED COUNT: 12.7 % (ref 11.5–14.5)
EST. GFR  (NO RACE VARIABLE): >60 ML/MIN/1.73 M^2
GLUCOSE SERPL-MCNC: 72 MG/DL (ref 70–110)
HCT VFR BLD AUTO: 38.7 % (ref 37–48.5)
HGB BLD-MCNC: 13.9 G/DL (ref 12–16)
IMM GRANULOCYTES # BLD AUTO: 0.03 K/UL (ref 0–0.04)
IMM GRANULOCYTES NFR BLD AUTO: 0.6 % (ref 0–0.5)
LYMPHOCYTES # BLD AUTO: 1.2 K/UL (ref 1–4.8)
LYMPHOCYTES NFR BLD: 24.7 % (ref 18–48)
MAGNESIUM SERPL-MCNC: 1.7 MG/DL (ref 1.6–2.6)
MCH RBC QN AUTO: 35.6 PG (ref 27–31)
MCHC RBC AUTO-ENTMCNC: 35.9 G/DL (ref 32–36)
MCV RBC AUTO: 99 FL (ref 82–98)
MONOCYTES # BLD AUTO: 0.5 K/UL (ref 0.3–1)
MONOCYTES NFR BLD: 9.3 % (ref 4–15)
NEUTROPHILS # BLD AUTO: 3.1 K/UL (ref 1.8–7.7)
NEUTROPHILS NFR BLD: 63.2 % (ref 38–73)
NRBC BLD-RTO: 0 /100 WBC
PHOSPHATE SERPL-MCNC: 3.9 MG/DL (ref 2.7–4.5)
PLATELET # BLD AUTO: 103 K/UL (ref 150–450)
PMV BLD AUTO: 8.4 FL (ref 9.2–12.9)
POTASSIUM SERPL-SCNC: 4.1 MMOL/L (ref 3.5–5.1)
PROT SERPL-MCNC: 7.1 G/DL (ref 6–8.4)
RBC # BLD AUTO: 3.9 M/UL (ref 4–5.4)
SODIUM SERPL-SCNC: 137 MMOL/L (ref 136–145)
WBC # BLD AUTO: 4.85 K/UL (ref 3.9–12.7)

## 2022-08-14 PROCEDURE — 80053 COMPREHEN METABOLIC PANEL: CPT | Performed by: HOSPITALIST

## 2022-08-14 PROCEDURE — 36415 COLL VENOUS BLD VENIPUNCTURE: CPT | Performed by: HOSPITALIST

## 2022-08-14 PROCEDURE — 85025 COMPLETE CBC W/AUTO DIFF WBC: CPT | Performed by: HOSPITALIST

## 2022-08-14 PROCEDURE — 83735 ASSAY OF MAGNESIUM: CPT | Performed by: HOSPITALIST

## 2022-08-14 PROCEDURE — 84100 ASSAY OF PHOSPHORUS: CPT | Performed by: HOSPITALIST

## 2022-08-14 PROCEDURE — 63600175 PHARM REV CODE 636 W HCPCS: Performed by: HOSPITALIST

## 2022-08-14 PROCEDURE — 25000003 PHARM REV CODE 250: Performed by: HOSPITALIST

## 2022-08-14 PROCEDURE — 99239 PR HOSPITAL DISCHARGE DAY,>30 MIN: ICD-10-PCS | Mod: ,,, | Performed by: HOSPITALIST

## 2022-08-14 PROCEDURE — 99239 HOSP IP/OBS DSCHRG MGMT >30: CPT | Mod: ,,, | Performed by: HOSPITALIST

## 2022-08-14 PROCEDURE — G0425 INPT/ED TELECONSULT30: HCPCS | Mod: GT,,, | Performed by: PSYCHIATRY & NEUROLOGY

## 2022-08-14 PROCEDURE — G0425 PR INPT TELEHEALTH CONSULT 30M: ICD-10-PCS | Mod: GT,,, | Performed by: PSYCHIATRY & NEUROLOGY

## 2022-08-14 RX ADMIN — THIAMINE HYDROCHLORIDE 500 MG: 100 INJECTION, SOLUTION INTRAMUSCULAR; INTRAVENOUS at 08:08

## 2022-08-14 RX ADMIN — DIVALPROEX SODIUM 500 MG: 250 TABLET, DELAYED RELEASE ORAL at 08:08

## 2022-08-14 RX ADMIN — SODIUM CHLORIDE, SODIUM LACTATE, POTASSIUM CHLORIDE, AND CALCIUM CHLORIDE: .6; .31; .03; .02 INJECTION, SOLUTION INTRAVENOUS at 01:08

## 2022-08-14 NOTE — NURSING
Patient was discharged, reviewed AVS, patient verbally understood to f/u with [facility] provider on 08/15.  She had no concerns, no respiratory distress, and was transported with boyfriend Sravani at side to outside of [facility], denied lyft request and are taking uber.     Notified mother that patient was discharged and to f/u with psyc provider appt tomorrow.
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
## 2022-08-14 NOTE — PT/OT/SLP PROGRESS
Physical Therapy    Patient presents ambulating in room with SO present. Ambulates independently within room with no evidence for LOB gait deviations.     No further need for RW on DC.     Recommended Plan:  Patient to be discharged to home with assistance as needed. No further PT needed at this time.     PT Received On: 08/14/2022  PT Start Time:  0852  PT Stop Time: 0854  PT Total Time (min):  2 minutes, no charges filed

## 2022-08-14 NOTE — PLAN OF CARE
Problem: Adult Inpatient Plan of Care  Goal: Plan of Care Review  Outcome: Ongoing, Progressing     Problem: Skin Injury Risk Increased  Goal: Skin Health and Integrity  Outcome: Ongoing, Progressing

## 2022-08-16 LAB — METHYLMALONATE SERPL-SCNC: 0.21 UMOL/L

## 2022-08-19 NOTE — DISCHARGE SUMMARY
"[facility] - Intensive Care ([facility] Medicine  Discharge Summary      Patient Name: Juany Hairston  MRN: [id number]  Patient Class: IP- Inpatient  Admission Date: 8/11/2022  Hospital Length of Stay: 3 days  Discharge Date and Time: 8/14/2022  9:13 AM  Attending Physician: Gaetano Robert MD  Discharging Provider: Gaetano Robert MD      HPI:   Per Vanda Hoang, NP:    "HPI is taken from pt's boyfriend Sravani Kathleen at bedside as patient is currently disoriented.     Juany Hairston is a 22 year old lady with a PMHx of bipolar and depression noncompliant to medication, WPW syndrome and heavy alcohol use who was brought into the ED via EMS who was activated by her boyfriend for a witnessed tonic clonic seizure. Pt's boyfriend reports that he found pt awake this morning at 7.30 am PTA being very fidgety, paranoid with hyperarousal, which was different from her baseline behavior. Pt's boyfriend then witnessed a tonic clonic seizure lasting 20-25s on the bed with no other sustained injuries. He reports not knowing if patient bit her tongue. Patient's boyfriend noticed that her pill box containing at least 15 pills of 50mg seroquel and at least 15 pills of 100mg seroquel were empty. Pt's boyfriend had prepared her pill box on Sunday prior to her travel to [place]. He reports that pt's other medications such as vitamin C and K pills (for bruising) were intact. Pt's boyfriend reports that she does not take her prescribed medications daily.     Patient's boyfriend reports that she was visiting from out of state and she is residing in [place]. Pt was last well at 10pm last night after returning home from dinner and drinks.     Patient had a seizure in the ED and was given Lorazepam 2mg IV. CT brain was negative. Workup shows increased anion gap with otherwise no metabolic abnormalities. UDS -ve, serum tylenol and etoh negative. EKG was positive for WPW. She is admitted to hospital medicine for seizure risk and " "altered mental state. "    Hospital Course:   Patient is a 22 year-old woman with history of depression, bipolar disorder, alcohol abuse admitted to the hospital following quetiapine overdose and alcohol withdrawal possible Wernicke's encephalopathy and seizures.  Patient also evidence of metabolic acidosis.  Clinically improving with intravenous thiamine, benzodiazepines, and intravenous fluids.  Overall clinically improved with less tremor and hyperreflexia but still making incoherent statements at times and tangential.  Psychiatry consulted recommended scheduled divalproex along wit PRN doses as needed.  Patient remained significantly agitated and started on dexmedetomidine infusion.  Dexamethasone infusion weaned off and patient's confusion resolved.  Patient now alert and oriented and following commands appropriately.  Psychiatry reconsulted to evaluate for possible risk of some self-harm in light of recent intentional drug overdose.  Patient denied any suicidal ideation at this time.  Psychiatrist recommended against inpatient psychiatric treatment at this time bedside recommended outpatient treatment which patient is willing to undergo.  Patient discharged in stable condition with family with close outpatient psychiatric treatment advised.  No psychiatric medications recommended at this time.       Goals of Care Treatment Preferences:  Code Status: Full Code      Consults:   Consults (From admission, onward)        Status Ordering Provider     Inpatient consult to Telemedicine - Psyc  Once        Provider:  (Not yet assigned)    Completed ALEC LOVELACE     Inpatient consult to Telemedicine - Psych  Once        Provider:  Marvin Carrillo MD    Completed ALEC LOVELACE          Final Active Diagnoses:    Diagnosis Date Noted POA    PRINCIPAL PROBLEM:  Overdose of antipsychotic, accidental or unintentional, initial encounter [T43.501A] 08/11/2022 Yes    Acute metabolic encephalopathy [G93.41]  Yes    " Alcohol withdrawal syndrome without complication [F10.230] 08/11/2022 Yes    Depression [F32.A] 2018 Yes     Chronic      Problems Resolved During this Admission:    Diagnosis Date Noted Date Resolved POA    Seizure [R56.9] 08/11/2022 08/11/2022 Yes       Discharged Condition: Stable    Disposition: Home or Self Care    Follow Up:   Follow-up Information     Jory PIERRE. Schedule an appointment as soon as possible for a visit on 8/15/2022.                     Patient Instructions:      Diet Adult Regular     Notify your health care provider if you experience any of the following:  temperature >100.4     Notify your health care provider if you experience any of the following:  persistent nausea and vomiting or diarrhea     Notify your health care provider if you experience any of the following:  severe uncontrolled pain     Notify your health care provider if you experience any of the following:  difficulty breathing or increased cough     Notify your health care provider if you experience any of the following:  severe persistent headache     Notify your health care provider if you experience any of the following:  worsening rash     Notify your health care provider if you experience any of the following:  persistent dizziness, light-headedness, or visual disturbances     Notify your health care provider if you experience any of the following:  increased confusion or weakness     Activity as tolerated        Medications:  Reconciled Home Medications:      Medication List      STOP taking these medications    QUEtiapine 50 MG tablet  Commonly known as: SEROQUEL     sertraline 100 MG tablet  Commonly known as: ZOLOFT            Indwelling Lines/Drains at time of discharge:   Lines/Drains/Airways     None                 Time spent on the discharge of patient: 35 minutes         Gaetano Robert MD  Department of Hospital Medicine  [facility] Intensive [facility])

## 2022-12-03 ENCOUNTER — HOSPITAL ENCOUNTER (EMERGENCY)
Facility: HOSPITAL | Age: 22
Discharge: HOME | End: 2022-12-03
Attending: EMERGENCY MEDICINE
Payer: COMMERCIAL

## 2022-12-03 VITALS
SYSTOLIC BLOOD PRESSURE: 112 MMHG | RESPIRATION RATE: 18 BRPM | HEART RATE: 101 BPM | HEIGHT: 69 IN | WEIGHT: 140 LBS | TEMPERATURE: 97.9 F | DIASTOLIC BLOOD PRESSURE: 64 MMHG | OXYGEN SATURATION: 95 % | BODY MASS INDEX: 20.73 KG/M2

## 2022-12-03 DIAGNOSIS — I45.6 WOLFF-PARKINSON-WHITE (WPW) PATTERN: ICD-10-CM

## 2022-12-03 DIAGNOSIS — R00.0 RAPID HEART RATE: Primary | ICD-10-CM

## 2022-12-03 PROCEDURE — 93005 ELECTROCARDIOGRAM TRACING: CPT

## 2022-12-03 PROCEDURE — 93005 ELECTROCARDIOGRAM TRACING: CPT | Performed by: EMERGENCY MEDICINE

## 2022-12-03 PROCEDURE — 99283 EMERGENCY DEPT VISIT LOW MDM: CPT

## 2022-12-03 ASSESSMENT — ENCOUNTER SYMPTOMS
VOMITING: 0
SHORTNESS OF BREATH: 0
CONFUSION: 0
NECK PAIN: 0
SORE THROAT: 0
DYSURIA: 0
DIZZINESS: 0
ABDOMINAL PAIN: 0
LIGHT-HEADEDNESS: 0
NUMBNESS: 0
NECK STIFFNESS: 0
COUGH: 0
WEAKNESS: 0
FLANK PAIN: 0
CHILLS: 0
FEVER: 0
COLOR CHANGE: 0
EYE PAIN: 0
NAUSEA: 0
PALPITATIONS: 1

## 2022-12-03 NOTE — ED PROVIDER NOTES
Emergency Medicine Note  HPI   HISTORY OF PRESENT ILLNESS     HPI     23yo F with a PMHx of WPW (has seen cardiology this past summer), presenting from urgent care with rapid HR. Patient reports that she has had cough/congestion for 1.5 months now. No chest pain or SOB. Was drinking last night, woke up this morning with rapid HR in 120s-150s. Tried to rest it off but wasn't going away and so went to urgent care. Was then referred to the ED.  At time of my evaluation, patient's HR was in 90s. She denies lightheadedness, dizziness, abdominal pain, N/V/D, urinary sxs, LE edema.         Patient History   PAST HISTORY     Reviewed from Nursing Triage:       Past Medical History:   Diagnosis Date   • Miky-Parkinson-White syndrome        History reviewed. No pertinent surgical history.    History reviewed. No pertinent family history.    Social History     Tobacco Use   • Smoking status: Every Day     Types: Cigarettes, Electronic Cigarette   • Smokeless tobacco: Never   Substance Use Topics   • Alcohol use: Yes     Comment: daily drinker, 5 drinks per day   • Drug use: Never         Review of Systems   REVIEW OF SYSTEMS     Review of Systems   Constitutional: Negative for chills and fever.   HENT: Negative for congestion and sore throat.    Eyes: Negative for pain and visual disturbance.   Respiratory: Negative for cough and shortness of breath.    Cardiovascular: Positive for palpitations. Negative for chest pain and leg swelling.   Gastrointestinal: Negative for abdominal pain, nausea and vomiting.   Genitourinary: Negative for dysuria and flank pain.   Musculoskeletal: Negative for neck pain and neck stiffness.   Skin: Negative for color change and rash.   Neurological: Negative for dizziness, weakness, light-headedness and numbness.   Psychiatric/Behavioral: Negative for confusion.         VITALS     ED Vitals    Date/Time Temp Pulse Resp BP SpO2 [facility]   12/03/22 1039 -- 101 -- -- --    12/03/22 1038 -- 101 -- -- --     12/03/22 1023 36.6 °C (97.9 °F) 184 18 112/64 95 % HKR                       Physical Exam   PHYSICAL EXAM     Physical Exam  Vitals and nursing note reviewed.   Constitutional:       General: She is not in acute distress.     Appearance: Normal appearance.   HENT:      Head: Normocephalic and atraumatic.      Nose: Nose normal.      Mouth/Throat:      Mouth: Mucous membranes are moist.   Eyes:      General: No scleral icterus.     Extraocular Movements: Extraocular movements intact.      Conjunctiva/sclera: Conjunctivae normal.   Cardiovascular:      Rate and Rhythm: Normal rate and regular rhythm.      Pulses: Normal pulses.      Heart sounds: Normal heart sounds.   Pulmonary:      Effort: Pulmonary effort is normal.      Breath sounds: Normal breath sounds.   Abdominal:      Palpations: Abdomen is soft.      Tenderness: There is no abdominal tenderness.   Musculoskeletal:         General: Normal range of motion.      Cervical back: Normal range of motion and neck supple. No rigidity.      Right lower leg: No edema.      Left lower leg: No edema.   Skin:     General: Skin is warm and dry.      Capillary Refill: Capillary refill takes less than 2 seconds.   Neurological:      General: No focal deficit present.      Mental Status: She is alert and oriented to person, place, and time.   Psychiatric:         Mood and Affect: Mood normal.         Behavior: Behavior normal.           PROCEDURES     Procedures     DATA     Results     None          Imaging Results    None         ECG 12 lead    (Results Pending)       Scoring tools                                  ED Course & MDM   MDM / ED COURSE / CLINICAL IMPRESSION / DISPO     MDM    23yo F with a PMHx of WPW (has seen cardiology this past summer), presenting from urgent care with rapid HR which has now resolved. VS otherwise WNL. Physical exam unremarkable.     HR logged in triage as 184 but this was on dynamap machine, not confirmed with EKG or seen on  patient's apple watch. HR has been in the 80s/90s since she has been in the ED. Discussed with patient, offered basic labs but she declines at this time. Agrees to go home and stay hydrated, eat meal. Recommended decreasing alcohol intake.     Patient stable for discharge at this time. Has known hx of WPW, will follow up with her cardiologist next week. Strict return precautions discussed.        Clinical Impression      Rapid heart rate   Miky-Parkinson-White (WPW) pattern     _________________     ED Disposition   Discharge                   Heather Hernández MD  Resident  12/03/22 2162

## 2022-12-03 NOTE — DISCHARGE INSTRUCTIONS
Please follow up with your cardiologist for a visit next week. Return to the emergency department for new or recurrent symptoms.

## 2022-12-04 LAB
ATRIAL RATE: 98
P AXIS: 60
PR INTERVAL: 122
QRS DURATION: 114
QT INTERVAL: 384
QTC CALCULATION(BAZETT): 490
R AXIS: 88
T WAVE AXIS: 64
VENTRICULAR RATE: 98

## 2022-12-04 PROCEDURE — 93010 ELECTROCARDIOGRAM REPORT: CPT | Performed by: INTERNAL MEDICINE

## 2022-12-04 NOTE — ED ATTESTATION NOTE
Procedures  Physical Exam  Review of Systems    12/4/20226:42 PM  I have personally seen and examined the patient.  I reviewed and agree with the PA/NP/Resident's assessment and plan of care.    My examination, assessment, and plan of care of Vianey Patterson is as follows:    The patient presents with WPW went to urgent care with a rapid heart rate and was told to come to the ED.  She also says that she has had some cough and congestion for a month and a half.  Has not had any chest pain or shortness of breath.  Is able to function pretty much normally despite the rapid heart rate.  Says that she was drinking some last night but not a lot.  She is being followed by a cardiologist for the WPW.    Exam: Heart rate was initially in the 180s and now has come down to the normal range.  EKG shows a normal sinus rhythm with a delta wave inferiorly and throughout much of the precordium.  Lungs are clear.  Heart S1-S2 without any murmur.  Abdomen soft and nontender.  No pedal edema.    Impression/Plan: Currently in sinus rhythm.  Patient is not overly interested in having blood drawn or having nasal swabs done.  Told her that she should probably try to eat foods high in potassium and calcium.  We also went over how to record her rhythm on her apple watch.  She will follow-up with her cardiologist.    Vital Signs Review: Vital signs have been reviewed. The oxygen saturation is  SpO2: 95 % which is normal.    This document was created using dragon dictation software.  There might be some typographical errors due to this technology.    We are in a pandemic with increased volumes and decreased capacity.      Ubaldo Ang MD  12/04/22 9740